# Patient Record
Sex: MALE | Race: ASIAN | NOT HISPANIC OR LATINO
[De-identification: names, ages, dates, MRNs, and addresses within clinical notes are randomized per-mention and may not be internally consistent; named-entity substitution may affect disease eponyms.]

---

## 2018-04-11 ENCOUNTER — APPOINTMENT (OUTPATIENT)
Dept: OTOLARYNGOLOGY | Facility: CLINIC | Age: 43
End: 2018-04-11
Payer: COMMERCIAL

## 2018-04-11 VITALS
BODY MASS INDEX: 26.99 KG/M2 | SYSTOLIC BLOOD PRESSURE: 127 MMHG | WEIGHT: 162 LBS | DIASTOLIC BLOOD PRESSURE: 90 MMHG | HEIGHT: 65 IN | RESPIRATION RATE: 18 BRPM | HEART RATE: 103 BPM

## 2018-04-11 DIAGNOSIS — J34.2 DEVIATED NASAL SEPTUM: ICD-10-CM

## 2018-04-11 DIAGNOSIS — J35.1 HYPERTROPHY OF TONSILS: ICD-10-CM

## 2018-04-11 PROCEDURE — 31575 DIAGNOSTIC LARYNGOSCOPY: CPT

## 2018-04-11 PROCEDURE — 99214 OFFICE O/P EST MOD 30 MIN: CPT | Mod: 25

## 2018-04-12 ENCOUNTER — APPOINTMENT (OUTPATIENT)
Dept: CT IMAGING | Facility: IMAGING CENTER | Age: 43
End: 2018-04-12
Payer: COMMERCIAL

## 2018-04-12 ENCOUNTER — OUTPATIENT (OUTPATIENT)
Dept: OUTPATIENT SERVICES | Facility: HOSPITAL | Age: 43
LOS: 1 days | End: 2018-04-12
Payer: COMMERCIAL

## 2018-04-12 DIAGNOSIS — Z98.89 OTHER SPECIFIED POSTPROCEDURAL STATES: Chronic | ICD-10-CM

## 2018-04-12 DIAGNOSIS — Z00.8 ENCOUNTER FOR OTHER GENERAL EXAMINATION: ICD-10-CM

## 2018-04-12 PROCEDURE — 74177 CT ABD & PELVIS W/CONTRAST: CPT | Mod: 26

## 2018-04-12 PROCEDURE — 74177 CT ABD & PELVIS W/CONTRAST: CPT

## 2018-06-21 ENCOUNTER — APPOINTMENT (OUTPATIENT)
Dept: SLEEP CENTER | Facility: CLINIC | Age: 43
End: 2018-06-21
Payer: COMMERCIAL

## 2018-06-21 ENCOUNTER — OUTPATIENT (OUTPATIENT)
Dept: OUTPATIENT SERVICES | Facility: HOSPITAL | Age: 43
LOS: 1 days | End: 2018-06-21
Payer: COMMERCIAL

## 2018-06-21 DIAGNOSIS — Z98.89 OTHER SPECIFIED POSTPROCEDURAL STATES: Chronic | ICD-10-CM

## 2018-06-21 PROCEDURE — 95810 POLYSOM 6/> YRS 4/> PARAM: CPT | Mod: 26

## 2018-06-21 PROCEDURE — 95810 POLYSOM 6/> YRS 4/> PARAM: CPT

## 2018-06-25 DIAGNOSIS — G47.33 OBSTRUCTIVE SLEEP APNEA (ADULT) (PEDIATRIC): ICD-10-CM

## 2018-07-02 ENCOUNTER — RESULT REVIEW (OUTPATIENT)
Age: 43
End: 2018-07-02

## 2018-07-26 ENCOUNTER — APPOINTMENT (OUTPATIENT)
Dept: PULMONOLOGY | Facility: CLINIC | Age: 43
End: 2018-07-26
Payer: COMMERCIAL

## 2018-07-26 VITALS
HEART RATE: 91 BPM | HEIGHT: 65 IN | TEMPERATURE: 97.4 F | OXYGEN SATURATION: 97 % | SYSTOLIC BLOOD PRESSURE: 90 MMHG | DIASTOLIC BLOOD PRESSURE: 60 MMHG | RESPIRATION RATE: 18 BRPM | BODY MASS INDEX: 43.82 KG/M2 | WEIGHT: 263 LBS

## 2018-07-26 PROCEDURE — 99215 OFFICE O/P EST HI 40 MIN: CPT | Mod: GC

## 2018-07-26 RX ORDER — MOMETASONE 50 UG/1
50 SPRAY, METERED NASAL DAILY
Qty: 1 | Refills: 5 | Status: DISCONTINUED | COMMUNITY
Start: 2018-04-11 | End: 2018-07-26

## 2018-09-18 ENCOUNTER — OUTPATIENT (OUTPATIENT)
Dept: OUTPATIENT SERVICES | Facility: HOSPITAL | Age: 43
LOS: 1 days | End: 2018-09-18
Payer: COMMERCIAL

## 2018-09-18 ENCOUNTER — APPOINTMENT (OUTPATIENT)
Dept: SLEEP CENTER | Facility: CLINIC | Age: 43
End: 2018-09-18
Payer: COMMERCIAL

## 2018-09-18 DIAGNOSIS — Z98.89 OTHER SPECIFIED POSTPROCEDURAL STATES: Chronic | ICD-10-CM

## 2018-09-18 PROCEDURE — 95806 SLEEP STUDY UNATT&RESP EFFT: CPT | Mod: 26

## 2018-09-18 PROCEDURE — 95806 SLEEP STUDY UNATT&RESP EFFT: CPT

## 2018-09-20 ENCOUNTER — RESULT REVIEW (OUTPATIENT)
Age: 43
End: 2018-09-20

## 2018-09-21 DIAGNOSIS — G47.33 OBSTRUCTIVE SLEEP APNEA (ADULT) (PEDIATRIC): ICD-10-CM

## 2018-10-12 ENCOUNTER — OUTPATIENT (OUTPATIENT)
Dept: OUTPATIENT SERVICES | Facility: HOSPITAL | Age: 43
LOS: 1 days | End: 2018-10-12
Payer: COMMERCIAL

## 2018-10-12 ENCOUNTER — APPOINTMENT (OUTPATIENT)
Dept: SLEEP CENTER | Facility: CLINIC | Age: 43
End: 2018-10-12
Payer: COMMERCIAL

## 2018-10-12 DIAGNOSIS — Z98.89 OTHER SPECIFIED POSTPROCEDURAL STATES: Chronic | ICD-10-CM

## 2018-10-12 PROCEDURE — 95807 SLEEP STUDY ATTENDED: CPT | Mod: 26

## 2018-10-12 PROCEDURE — 95807 SLEEP STUDY ATTENDED: CPT

## 2018-10-26 DIAGNOSIS — G47.33 OBSTRUCTIVE SLEEP APNEA (ADULT) (PEDIATRIC): ICD-10-CM

## 2018-11-26 ENCOUNTER — APPOINTMENT (OUTPATIENT)
Dept: PULMONOLOGY | Facility: CLINIC | Age: 43
End: 2018-11-26
Payer: COMMERCIAL

## 2018-11-26 VITALS
HEART RATE: 92 BPM | DIASTOLIC BLOOD PRESSURE: 79 MMHG | TEMPERATURE: 98.2 F | SYSTOLIC BLOOD PRESSURE: 117 MMHG | RESPIRATION RATE: 18 BRPM | OXYGEN SATURATION: 97 % | HEIGHT: 65 IN

## 2018-11-26 PROCEDURE — 99215 OFFICE O/P EST HI 40 MIN: CPT | Mod: GC

## 2018-11-28 ENCOUNTER — APPOINTMENT (OUTPATIENT)
Dept: OTOLARYNGOLOGY | Facility: CLINIC | Age: 43
End: 2018-11-28
Payer: COMMERCIAL

## 2018-11-28 VITALS
HEIGHT: 65 IN | WEIGHT: 160 LBS | HEART RATE: 101 BPM | DIASTOLIC BLOOD PRESSURE: 77 MMHG | SYSTOLIC BLOOD PRESSURE: 114 MMHG | BODY MASS INDEX: 26.66 KG/M2

## 2018-11-28 DIAGNOSIS — R06.83 SNORING: ICD-10-CM

## 2018-11-28 PROCEDURE — 31575 DIAGNOSTIC LARYNGOSCOPY: CPT

## 2018-11-28 PROCEDURE — 99215 OFFICE O/P EST HI 40 MIN: CPT | Mod: 25

## 2018-11-28 PROCEDURE — 92557 COMPREHENSIVE HEARING TEST: CPT

## 2018-11-28 PROCEDURE — 92567 TYMPANOMETRY: CPT

## 2018-11-29 ENCOUNTER — APPOINTMENT (OUTPATIENT)
Dept: OTOLARYNGOLOGY | Facility: CLINIC | Age: 43
End: 2018-11-29

## 2018-12-19 ENCOUNTER — APPOINTMENT (OUTPATIENT)
Dept: OTOLARYNGOLOGY | Facility: CLINIC | Age: 43
End: 2018-12-19
Payer: COMMERCIAL

## 2018-12-19 VITALS
SYSTOLIC BLOOD PRESSURE: 121 MMHG | HEIGHT: 64 IN | DIASTOLIC BLOOD PRESSURE: 74 MMHG | BODY MASS INDEX: 27.31 KG/M2 | HEART RATE: 106 BPM | WEIGHT: 160 LBS

## 2018-12-19 DIAGNOSIS — H91.93 UNSPECIFIED HEARING LOSS, BILATERAL: ICD-10-CM

## 2018-12-19 PROCEDURE — 99214 OFFICE O/P EST MOD 30 MIN: CPT | Mod: 25

## 2018-12-19 PROCEDURE — 92567 TYMPANOMETRY: CPT

## 2018-12-19 PROCEDURE — 92557 COMPREHENSIVE HEARING TEST: CPT

## 2018-12-19 RX ORDER — ACETYLCYSTEINE 600 MG
600 CAPSULE ORAL TWICE DAILY
Qty: 90 | Refills: 0 | Status: DISCONTINUED | COMMUNITY
Start: 2018-11-29 | End: 2018-12-19

## 2018-12-19 RX ORDER — ACETYLCYSTEINE 600 MG
600 CAPSULE ORAL
Qty: 42 | Refills: 2 | Status: DISCONTINUED | COMMUNITY
Start: 2018-11-28 | End: 2018-12-19

## 2018-12-19 RX ORDER — PREDNISONE 20 MG/1
20 TABLET ORAL DAILY
Qty: 30 | Refills: 1 | Status: DISCONTINUED | COMMUNITY
Start: 2018-11-28 | End: 2018-12-19

## 2018-12-20 ENCOUNTER — FORM ENCOUNTER (OUTPATIENT)
Age: 43
End: 2018-12-20

## 2018-12-20 ENCOUNTER — APPOINTMENT (OUTPATIENT)
Dept: PULMONOLOGY | Facility: CLINIC | Age: 43
End: 2018-12-20
Payer: COMMERCIAL

## 2018-12-20 VITALS
HEIGHT: 64 IN | HEART RATE: 100 BPM | OXYGEN SATURATION: 95 % | TEMPERATURE: 97.4 F | DIASTOLIC BLOOD PRESSURE: 60 MMHG | RESPIRATION RATE: 16 BRPM | SYSTOLIC BLOOD PRESSURE: 97 MMHG | BODY MASS INDEX: 27.31 KG/M2 | WEIGHT: 160 LBS

## 2018-12-20 PROCEDURE — 99214 OFFICE O/P EST MOD 30 MIN: CPT | Mod: GC

## 2018-12-21 ENCOUNTER — APPOINTMENT (OUTPATIENT)
Dept: MRI IMAGING | Facility: CLINIC | Age: 43
End: 2018-12-21
Payer: COMMERCIAL

## 2018-12-21 ENCOUNTER — OUTPATIENT (OUTPATIENT)
Dept: OUTPATIENT SERVICES | Facility: HOSPITAL | Age: 43
LOS: 1 days | End: 2018-12-21
Payer: COMMERCIAL

## 2018-12-21 DIAGNOSIS — H91.23 SUDDEN IDIOPATHIC HEARING LOSS, BILATERAL: ICD-10-CM

## 2018-12-21 DIAGNOSIS — H91.93 UNSPECIFIED HEARING LOSS, BILATERAL: ICD-10-CM

## 2018-12-21 DIAGNOSIS — Z98.89 OTHER SPECIFIED POSTPROCEDURAL STATES: Chronic | ICD-10-CM

## 2018-12-21 DIAGNOSIS — Z00.8 ENCOUNTER FOR OTHER GENERAL EXAMINATION: ICD-10-CM

## 2018-12-21 PROCEDURE — 70553 MRI BRAIN STEM W/O & W/DYE: CPT | Mod: 26

## 2018-12-21 PROCEDURE — 70553 MRI BRAIN STEM W/O & W/DYE: CPT

## 2018-12-21 PROCEDURE — A9585: CPT

## 2018-12-24 LAB
ALBUMIN SERPL ELPH-MCNC: 4.8 G/DL
ALP BLD-CCNC: 53 U/L
ALT SERPL-CCNC: 67 U/L
ANION GAP SERPL CALC-SCNC: 12 MMOL/L
AST SERPL-CCNC: 29 U/L
BASOPHILS # BLD AUTO: 0.01 K/UL
BASOPHILS NFR BLD AUTO: 0.1 %
BILIRUB SERPL-MCNC: 0.4 MG/DL
BUN SERPL-MCNC: 12 MG/DL
CALCIUM SERPL-MCNC: 9.8 MG/DL
CHLORIDE SERPL-SCNC: 104 MMOL/L
CO2 SERPL-SCNC: 22 MMOL/L
CREAT SERPL-MCNC: 0.91 MG/DL
EOSINOPHIL # BLD AUTO: 0 K/UL
EOSINOPHIL NFR BLD AUTO: 0 %
ERYTHROCYTE [SEDIMENTATION RATE] IN BLOOD BY WESTERGREN METHOD: 2 MM/HR
GLUCOSE SERPL-MCNC: 134 MG/DL
HCT VFR BLD CALC: 44.6 %
HGB BLD-MCNC: 15.5 G/DL
IMM GRANULOCYTES NFR BLD AUTO: 0 %
LYMPHOCYTES # BLD AUTO: 0.77 K/UL
LYMPHOCYTES NFR BLD AUTO: 11.5 %
MAN DIFF?: NORMAL
MCHC RBC-ENTMCNC: 32.4 PG
MCHC RBC-ENTMCNC: 34.8 GM/DL
MCV RBC AUTO: 93.1 FL
MONOCYTES # BLD AUTO: 0.01 K/UL
MONOCYTES NFR BLD AUTO: 0.1 %
NEUTROPHILS # BLD AUTO: 5.91 K/UL
NEUTROPHILS NFR BLD AUTO: 88.3 %
PLATELET # BLD AUTO: 273 K/UL
POTASSIUM SERPL-SCNC: 4.5 MMOL/L
PROT SERPL-MCNC: 7.3 G/DL
RBC # BLD: 4.79 M/UL
RBC # FLD: 12.8 %
SODIUM SERPL-SCNC: 138 MMOL/L
T PALLIDUM AB SER QL IA: NEGATIVE
T4 SERPL-MCNC: 5.9 UG/DL
TRIGL SERPL-MCNC: 123 MG/DL
TSH SERPL-ACNC: 0.23 UIU/ML
WBC # FLD AUTO: 6.7 K/UL

## 2018-12-26 ENCOUNTER — APPOINTMENT (OUTPATIENT)
Dept: OTOLARYNGOLOGY | Facility: CLINIC | Age: 43
End: 2018-12-26

## 2018-12-27 LAB — ANA SER IF-ACNC: NEGATIVE

## 2019-01-04 ENCOUNTER — APPOINTMENT (OUTPATIENT)
Dept: OTOLARYNGOLOGY | Facility: CLINIC | Age: 44
End: 2019-01-04
Payer: COMMERCIAL

## 2019-01-04 VITALS
BODY MASS INDEX: 26.33 KG/M2 | SYSTOLIC BLOOD PRESSURE: 114 MMHG | WEIGHT: 158 LBS | HEART RATE: 115 BPM | DIASTOLIC BLOOD PRESSURE: 76 MMHG | HEIGHT: 65 IN

## 2019-01-04 DIAGNOSIS — H90.12 CONDUCTIVE HEARING LOSS, UNILATERAL, LEFT EAR, WITH UNRESTRICTED HEARING ON THE CONTRALATERAL SIDE: ICD-10-CM

## 2019-01-04 PROCEDURE — 31231 NASAL ENDOSCOPY DX: CPT

## 2019-01-04 PROCEDURE — 92567 TYMPANOMETRY: CPT

## 2019-01-04 PROCEDURE — 92557 COMPREHENSIVE HEARING TEST: CPT

## 2019-01-04 PROCEDURE — 69433 CREATE EARDRUM OPENING: CPT | Mod: LT

## 2019-01-04 PROCEDURE — 99214 OFFICE O/P EST MOD 30 MIN: CPT | Mod: 25

## 2019-01-04 NOTE — PROCEDURE
[FreeTextEntry6] : Afrin and lidocaine were topically sprayed. Flexible scope #2 was used. Right nasal passage with normal boggy edematous inferior turbinate and normal middle and superior turbinates. Nasal passage patent with clear middle meatus and sphenoethmoid recess. Left nasal passage with normal boggy edematous inferior turbinate and normal middle and superior turbinates. Nasal passage was patent with clear middle meatus and sphenoethmoid recess. No mucopurulence or polyps appreciated. Nasopharynx with mild adenoid tissue with bilateral ET orifices effaced with lymphoid tissue. [Same] : same as the Pre Op Dx. [] : M & T [FreeTextEntry4] : topical tetracaine [FreeTextEntry5] : salazar tube placed, suctioned out clear milena effusion

## 2019-01-04 NOTE — HISTORY OF PRESENT ILLNESS
[de-identified] : 44yo male with hx UPPP/sept by  for SERENE who was seen for sudden hearing loss in November by Dr. Godinez. He placed him on high dose steroid taper which he completed but he did not have significant improvement and was a bit worse. He was given another course but tapered it early as he found no improvement. He was also told to start NAC which he has been taking daily. He had MRI which was negative for retrocochlear lesion however he did have bilateral mastoid effusions. He is using flonase nearly daily. He has been using zyrtec and sudafed for the past four or five days and he feels it is helping a little bit.

## 2019-01-04 NOTE — ASSESSMENT
[FreeTextEntry1] : left serous otitis media with mixed HL left:\par - tympanostomy tube placed today\par \par b/l sudden SNHL:\par - failed two courses of steroids \par - repeat audio next visit\par \par nasal congestion/ETD:\par - increase flonase to BID\par - cont zyrtec and sudafed\par - may have him start irrigations with budesonide next visit if persistent inflammation \par

## 2019-01-04 NOTE — PHYSICAL EXAM
[de-identified] : right mucoid stranding and left with complete milena effusion [de-identified] : edematous [de-identified] : clear mucoid [Normal] : mucosa is normal [Midline] : trachea located in midline position

## 2019-01-07 PROBLEM — H90.12 CONDUCTIVE HEARING LOSS IN LEFT EAR: Status: ACTIVE | Noted: 2019-01-07

## 2019-01-09 ENCOUNTER — APPOINTMENT (OUTPATIENT)
Dept: OTOLARYNGOLOGY | Facility: CLINIC | Age: 44
End: 2019-01-09

## 2019-02-15 ENCOUNTER — APPOINTMENT (OUTPATIENT)
Dept: OTOLARYNGOLOGY | Facility: CLINIC | Age: 44
End: 2019-02-15
Payer: COMMERCIAL

## 2019-02-15 VITALS
BODY MASS INDEX: 26.33 KG/M2 | HEIGHT: 65 IN | HEART RATE: 97 BPM | WEIGHT: 158 LBS | SYSTOLIC BLOOD PRESSURE: 114 MMHG | DIASTOLIC BLOOD PRESSURE: 74 MMHG

## 2019-02-15 DIAGNOSIS — H91.23 SUDDEN IDIOPATHIC HEARING LOSS, BILATERAL: ICD-10-CM

## 2019-02-15 DIAGNOSIS — H90.8 MIXED CONDUCTIVE AND SENSORINEURAL HEARING LOSS, UNSPECIFIED: ICD-10-CM

## 2019-02-15 DIAGNOSIS — J35.2 HYPERTROPHY OF ADENOIDS: ICD-10-CM

## 2019-02-15 DIAGNOSIS — H65.92 UNSPECIFIED NONSUPPURATIVE OTITIS MEDIA, LEFT EAR: ICD-10-CM

## 2019-02-15 DIAGNOSIS — R09.81 NASAL CONGESTION: ICD-10-CM

## 2019-02-15 PROCEDURE — 92557 COMPREHENSIVE HEARING TEST: CPT

## 2019-02-15 PROCEDURE — 99214 OFFICE O/P EST MOD 30 MIN: CPT | Mod: 25

## 2019-02-15 PROCEDURE — 31231 NASAL ENDOSCOPY DX: CPT

## 2019-02-15 PROCEDURE — 92567 TYMPANOMETRY: CPT

## 2019-02-15 NOTE — ASSESSMENT
[FreeTextEntry1] : left serous otitis media with mixed HL left:\par - CHL resolved with tube placememt\par \par new right sided effusion:\par - has URI so may be due to that\par - advised to start neilmed rinse daily and add budesonide to irrigation\par - flonase 2 sprays twice a day\par \par b/l sudden SNHL:\par - seems stable on audio\par \par nasal congestion/ETD/adenoid hypertrophy:\par - increase flonase to BID\par - cont zyrtec and sudafed\par - start irrigations with budesonide next visit for persistent inflammation \par \par f/u 4-6 weeks

## 2019-02-15 NOTE — HISTORY OF PRESENT ILLNESS
[de-identified] : 42yo male with hx UPPP/septo by  for SERENE who was seen for sudden hearing loss in November by Dr. Godinez. He placed him on high dose steroid taper which he completed but he did not have significant improvement and was a bit worse. He was given another course but tapered it early as he found no improvement. He was also told to start NAC which he has been taking daily. He had MRI which was negative for retrocochlear lesion however he did have bilateral mastoid effusions. He is using flonase nearly daily. He has been using zyrtec and sudafed for the past four or five days and he feels it is helping a little bit. [FreeTextEntry1] : At last visit noted to have Lt. Serous OM with Mixed HL - Had Tube placed on left 1/4/19.   Also B/L SSNHL - Failed two courses of steroids.\par Now feels hearing is the left ear is a bit improved, however still not fully returned.  No pain, no d/c, No Vertigo. \par Continues to have Nasal congestion.  Continues to Use Flonase, advised at last visit to increase to BID, however continues to use QD.  Also using Zyrtec but not Sudafed.

## 2019-02-15 NOTE — PROCEDURE
[Same] : same as the Pre Op Dx. [] : M & T [FreeTextEntry6] : Afrin and lidocaine were topically sprayed. Flexible scope #2 was used. Right nasal passage with boggy edematous inferior turbinate and normal middle and superior turbinates. Nasal passage patent with clear middle meatus and sphenoethmoid recess. Left nasal passage with  boggy edematous inferior turbinate and normal middle and superior turbinates. Nasal passage was patent with clear middle meatus and sphenoethmoid recess. No mucopurulence or polyps appreciated. Nasopharynx with mild adenoid tissue with bilateral ET orifices effaced with lymphoid tissue. [FreeTextEntry4] : topical tetracaine [FreeTextEntry5] : salazar tube placed, suctioned out clear milena effusion

## 2019-02-15 NOTE — PHYSICAL EXAM
[Normal] : mucosa is normal [Midline] : trachea located in midline position [de-identified] : Right now with complete effusion.  Left with Tube in place and open.  [de-identified] : edematous [de-identified] : clear mucoid

## 2019-04-05 ENCOUNTER — APPOINTMENT (OUTPATIENT)
Dept: OTOLARYNGOLOGY | Facility: CLINIC | Age: 44
End: 2019-04-05

## 2020-08-22 ENCOUNTER — FORM ENCOUNTER (OUTPATIENT)
Age: 45
End: 2020-08-22

## 2020-09-07 ENCOUNTER — FORM ENCOUNTER (OUTPATIENT)
Age: 45
End: 2020-09-07

## 2020-09-23 ENCOUNTER — APPOINTMENT (OUTPATIENT)
Dept: PULMONOLOGY | Facility: CLINIC | Age: 45
End: 2020-09-23
Payer: COMMERCIAL

## 2020-09-23 VITALS
BODY MASS INDEX: 24.19 KG/M2 | HEART RATE: 101 BPM | WEIGHT: 145.38 LBS | SYSTOLIC BLOOD PRESSURE: 131 MMHG | OXYGEN SATURATION: 98 % | TEMPERATURE: 97.6 F | RESPIRATION RATE: 15 BRPM | DIASTOLIC BLOOD PRESSURE: 93 MMHG

## 2020-09-23 PROCEDURE — 99214 OFFICE O/P EST MOD 30 MIN: CPT | Mod: GC

## 2020-09-23 NOTE — PHYSICAL EXAM
[General Appearance - Well Developed] : well developed [General Appearance - Well Nourished] : well nourished [Low Lying Soft Palate] : low lying soft palate [IV] : IV [Neck Appearance] : the appearance of the neck was normal [Heart Rate And Rhythm] : heart rate was normal and rhythm regular [Heart Sounds] : normal S1 and S2 [] : no respiratory distress [Auscultation Breath Sounds / Voice Sounds] : lungs were clear to auscultation bilaterally [Bowel Sounds] : normal bowel sounds [Abdomen Soft] : soft [Abdomen Tenderness] : non-tender [Nail Clubbing] : no clubbing of the fingernails [Cyanosis, Localized] : no localized cyanosis [Skin Color & Pigmentation] : normal skin color and pigmentation [Skin Turgor] : normal skin turgor [Motor Exam] : the motor exam was normal [No Focal Deficits] : no focal deficits [Oriented To Time, Place, And Person] : oriented to person, place, and time [Mood] : the mood was normal

## 2020-09-24 NOTE — HISTORY OF PRESENT ILLNESS
[Snoring] : snoring [Witnessed Apneas] : witnessed sleep apnea [Frequent Nocturnal Awakening] : frequent nocturnal awakening [Daytime Somnolence] : daytime somnolence [Unintentional Sleep While Inactive] : unintentional sleep while inactive [Awakes Unrefreshed] : awakening unrefreshed [DMS] : DMS [Sleep Paralysis] : sleep paralysis [Obstructive Sleep Apnea] : obstructive sleep apnea [FreeTextEntry1] : 45M hx severe SERENE s/p UPPP, who comes for follow up visit. Last seen on 12/20/2018. At that time he was using APAP with FFM but continued to have incomplete resolution of sleep disordered breathing. Large mask leak was thought to be the cause. He was ordered for mask fitting a Sleep center with plans for nasal mask w/chin strap. However pt was lost to follow up. \par \par Today pt states that he is using his CPAP every day, but feels that it is not working any more. At times, does not have enough pressure and wife has noticed apneic episodes. At times, the humidifier does not always empty by the morning or empties too quickly. Last set of supplies was delivered to him 6-9 months ago. Wears FFM, cannot tolerate nasal because of choking/gagging. He is also requesting using a constant pressure (currently on APAP). \par \par Also has sleep paralysis, where he cannot move his hands or legs and experiences numbness on the top of his foot. Happens every night. No parasomnias or vivid dreams. \par \par Sleep schedule: 11:30PM to bed, sleep latency <10 min, 3-4 nighttime awakenings- has difficult time falling asleep when he wakes up, wakes up at 6:30-7AM, nonrestorative; on average sleeping 7 hours; no naps during the day. Prev had drowsy driving. no AM headaches\par \par PAP compliance: 100% days over 4 hours, uses 7 hr 27 min on days used; Therapy AHI 10.6; 90th percentile 12.4 cmH2O. \par PAP Acclimatization 10/2018: Dreamwear FFM, medium (158 lb) \par HST 9/2018: KARTIK 72.5, T90 57.8% \par PSG 6/2018: AHI 92.9, T90 61.3% \par CPAP titration 5/2016: optimal CPAP 10 cmH2O [Unintentional Sleep while Active] : no unintentional sleep while active [Awakes with Headache] : no headache upon awakening [Awakening With Dry Mouth] : no dry mouth upon awakening [Recent  Weight Gain] : no recent weight gain [DIS] : no DIS [Unusual Sleep Behavior] : no unusual sleep behavior [Hypersomnolence] : no hypersomnolence [Cataplexy] : no cataplexy [Hypnagogic Hallucinations] : no hallucinations when falling asleep [Hypnopompic Hallucinations] : no hallucinations when awakening [Lower Extremity Discomfort] : no lower extremity discomfort in evening or at bedtime

## 2020-09-24 NOTE — REVIEW OF SYSTEMS
[EDS: ESS=____] : daytime somnolence: ESS=[unfilled] [Snoring] : snoring [Witnessed Apneas] : witnessed apnea [Difficulty Maintaining Sleep] : difficulty maintaining sleep [Sleep Paralysis] : sleep paralysis [Negative] : Psychiatric [Fatigue] : no fatigue [Nasal Congestion] : no nasal congestion [Shortness Of Breath] : no shortness of breath [Difficulty Initiating Sleep] : no difficulty falling asleep [Lower Extremity Discomfort] : no lower extremity discomfort [Irresistible urge to move legs] : no irresistible urge to move legs because of lower extremity discomfort [Late day/ Evening symptoms] : no late day/evening symptoms [Sleep Disturbances due to LE symptoms] : ~T no sleep disturbances due to lower extremity symptoms [Unusual Sleep Behavior] : no unusual sleep behavior [Hypersomnolence] : not sleeping much more than usual [Cataplexy] :  no cataplexy

## 2020-09-24 NOTE — ASSESSMENT
[FreeTextEntry1] : 45M hx severe SERENE s/p UPPP, who comes for follow up visit. Last seen on 12/20/2018. At that time he was using APAP with FFM. He is currently has good compliance with his PAP therapy with therapy AHI 10.1. His wife has noticed more witnessed apneic episodes. Will change from APAP to CPAP of 12 cmH2O as he reports that with APAP he knows periods of insufficient airflow and the download of waveforms and therapy data suggest recurrence of disordered breathing events when the APAP pressure is reduced.  Therefore he may do better with the 90th percentile pressure of CPAP of 12 cm of water pressure.  His machine will be reset to CPAP of 12 and we will then assess his response both clinically and with the downloaded therapy data from his device.   reassess response. \par - supplies refilled for patient\par - f/u 1 month

## 2020-09-30 ENCOUNTER — APPOINTMENT (OUTPATIENT)
Dept: OTOLARYNGOLOGY | Facility: CLINIC | Age: 45
End: 2020-09-30
Payer: COMMERCIAL

## 2020-09-30 VITALS
BODY MASS INDEX: 24.16 KG/M2 | TEMPERATURE: 97.2 F | DIASTOLIC BLOOD PRESSURE: 71 MMHG | HEART RATE: 102 BPM | SYSTOLIC BLOOD PRESSURE: 110 MMHG | HEIGHT: 65 IN | WEIGHT: 145 LBS

## 2020-09-30 PROCEDURE — 99214 OFFICE O/P EST MOD 30 MIN: CPT

## 2020-10-02 NOTE — HISTORY OF PRESENT ILLNESS
[de-identified] : 42yo male with hx UPPP/septo by  for SERENE who was seen for sudden hearing loss in November 2018 by Dr. Godinez. He placed him on high dose steroid taper which he completed but he did not have significant improvement and was a bit worse. He was given another course but tapered it early as he found no improvement. He was also told to start NAC which he has been taking daily. He had MRI which was negative for retrocochlear lesion however he did have bilateral mastoid effusions.  [FreeTextEntry1] : At last visit noted to have Lt. Serous OM with Mixed HL - Had Tube placed on left 1/4/19.   Also B/L SSNHL - Failed two courses of steroids.\par Now feels hearing is the left ear is a bit improved, however still not fully returned.  No pain, no d/c, No Vertigo. \par Continues to have Nasal congestion.  Continues to Use Flonase, advised at last visit to increase to BID, however continues to use QD.  Also using Zyrtec but not Sudafed.

## 2020-10-02 NOTE — PHYSICAL EXAM
[Normal] : mucosa is normal [Midline] : trachea located in midline position [Removed] : palatine tonsils previously removed [de-identified] : Right now with complete effusion.  Left with Tube in place and open.  [de-identified] : edematous [de-identified] : clear mucoid

## 2020-10-02 NOTE — ASSESSMENT
[FreeTextEntry1] : left otorrhea with tube in place:\par - ciprodex instilled now\par \par b/l SNHL:\par - repeat audio next visit\par \par nasal congestion/ETD/adenoid inflammation:\par - fluticasone/astelin BID\par \par f/u 2 weeks

## 2020-10-19 ENCOUNTER — APPOINTMENT (OUTPATIENT)
Dept: OTOLARYNGOLOGY | Facility: CLINIC | Age: 45
End: 2020-10-19
Payer: COMMERCIAL

## 2020-10-19 VITALS
BODY MASS INDEX: 25.27 KG/M2 | DIASTOLIC BLOOD PRESSURE: 70 MMHG | SYSTOLIC BLOOD PRESSURE: 122 MMHG | HEART RATE: 97 BPM | HEIGHT: 64 IN | TEMPERATURE: 97.6 F | WEIGHT: 148 LBS

## 2020-10-19 DIAGNOSIS — H92.02 OTALGIA, LEFT EAR: ICD-10-CM

## 2020-10-19 DIAGNOSIS — H69.82 OTHER SPECIFIED DISORDERS OF EUSTACHIAN TUBE, LEFT EAR: ICD-10-CM

## 2020-10-19 PROCEDURE — 99213 OFFICE O/P EST LOW 20 MIN: CPT

## 2020-10-19 PROCEDURE — 99072 ADDL SUPL MATRL&STAF TM PHE: CPT

## 2020-10-19 RX ORDER — ACETYLCYSTEINE 600 MG
600 CAPSULE ORAL
Qty: 42 | Refills: 2 | Status: DISCONTINUED | COMMUNITY
Start: 2018-12-19 | End: 2020-10-19

## 2020-10-19 RX ORDER — BUDESONIDE 0.5 MG/2ML
0.5 INHALANT ORAL
Qty: 1 | Refills: 5 | Status: DISCONTINUED | COMMUNITY
Start: 2019-02-15 | End: 2020-10-19

## 2020-10-19 NOTE — ASSESSMENT
[FreeTextEntry1] : Lt. Otalgia\par - Tube in place with small granulation tissue. \par - Ciprodex gtts. x 10 days\par - F/U 2 weeks. \par \par B/l SNHL\par - Repeat Audio at next visit. \par \par ETD / Congestion\par - Continue Flonase and Azelastine.

## 2020-10-19 NOTE — PHYSICAL EXAM
[Normal] : external ears are normal bilaterally [de-identified] : Right tube in place with small granulation tissue at inferior portion of tube.

## 2020-10-19 NOTE — HISTORY OF PRESENT ILLNESS
[de-identified] : 44 y/o M with Hx of SSNHL November 2018, treated by Dr. Godinez with High dose steroids without improvement.  MRI showed b/l mastoid effusions.  \par Then at F/U had Lt. Serous OM and Tube was placed in left on 1/4/19.   \par Using Flonase and Zyrtec - notes nasal congestion is improved\par At last visit on 9/30/20 noted to hve Lt. otorrhea with tube in place.  Treated with Ciprodex.   He notes left ear d/c improved.  Now yesterday started having left ear pain that radiates to his face.  No d/c.  Hearing is better then last visit.

## 2020-10-21 ENCOUNTER — APPOINTMENT (OUTPATIENT)
Dept: OTOLARYNGOLOGY | Facility: CLINIC | Age: 45
End: 2020-10-21

## 2021-06-02 ENCOUNTER — APPOINTMENT (OUTPATIENT)
Dept: HEPATOLOGY | Facility: CLINIC | Age: 46
End: 2021-06-02
Payer: COMMERCIAL

## 2021-06-02 VITALS
DIASTOLIC BLOOD PRESSURE: 65 MMHG | WEIGHT: 154 LBS | TEMPERATURE: 97.9 F | HEART RATE: 111 BPM | BODY MASS INDEX: 26.29 KG/M2 | SYSTOLIC BLOOD PRESSURE: 134 MMHG | RESPIRATION RATE: 15 BRPM | HEIGHT: 64 IN

## 2021-06-02 PROCEDURE — 99072 ADDL SUPL MATRL&STAF TM PHE: CPT

## 2021-06-02 PROCEDURE — 91200 LIVER ELASTOGRAPHY: CPT

## 2021-06-02 PROCEDURE — 99204 OFFICE O/P NEW MOD 45 MIN: CPT

## 2021-06-02 RX ORDER — PREDNISONE 20 MG/1
20 TABLET ORAL DAILY
Qty: 60 | Refills: 1 | Status: COMPLETED | COMMUNITY
Start: 2018-12-19 | End: 2021-06-02

## 2021-06-02 RX ORDER — FLUTICASONE PROPIONATE 50 UG/1
50 SPRAY, METERED NASAL
Qty: 3 | Refills: 3 | Status: COMPLETED | COMMUNITY
Start: 2020-10-01 | End: 2021-06-02

## 2021-06-02 RX ORDER — CIPROFLOXACIN AND DEXAMETHASONE 3; 1 MG/ML; MG/ML
0.3-0.1 SUSPENSION/ DROPS AURICULAR (OTIC) TWICE DAILY
Qty: 1 | Refills: 0 | Status: COMPLETED | COMMUNITY
Start: 2020-09-30 | End: 2021-06-02

## 2021-06-02 RX ORDER — CIPROFLOXACIN AND DEXAMETHASONE 3; 1 MG/ML; MG/ML
0.3-0.1 SUSPENSION/ DROPS AURICULAR (OTIC)
Qty: 1 | Refills: 0 | Status: COMPLETED | COMMUNITY
Start: 2020-10-19 | End: 2021-06-02

## 2021-06-02 RX ORDER — AZELASTINE HYDROCHLORIDE AND FLUTICASONE PROPIONATE 137; 50 UG/1; UG/1
137-50 SPRAY, METERED NASAL
Qty: 1 | Refills: 5 | Status: COMPLETED | COMMUNITY
Start: 2020-09-30 | End: 2021-06-02

## 2021-06-02 RX ORDER — FLUTICASONE PROPIONATE 50 MCG
SPRAY, SUSPENSION NASAL
Refills: 0 | Status: COMPLETED | COMMUNITY
End: 2021-06-02

## 2021-06-02 RX ORDER — AZELASTINE HYDROCHLORIDE 137 UG/1
0.1 SPRAY, METERED NASAL TWICE DAILY
Qty: 3 | Refills: 3 | Status: COMPLETED | COMMUNITY
Start: 2020-10-01 | End: 2021-06-02

## 2021-06-02 NOTE — HISTORY OF PRESENT ILLNESS
[de-identified] : Mr. Muse is a 45 yo M with overweight BMI, dyslipidemia (currently off medication), history of pre-diabetes (with HbA1c 5.8% in 2016, improved to 5.6% on last labs on 5/7/21), SERENE (on CPAP), and alcohol use disorder, who is being seen for evaluation of abnormal liver enzymes on his recent labs. He is accompanied by his wife, Rhett Muse, who is a nurse practitioner at the [facility].\par \par He has had hepatic steatosis noted on prior abdominal sonogram in 2016 and contrast-enhanced CT in 2018, as well as a history of mild liver enzyme elevations dating back several years. His wife reports that he had a rise in his liver enzymes in 2016 after being treated with atorvastatin, leading to discontinuation of the medication as well as to the liver imaging as part of his work-up. She became concerned after his most recent labs (from 5/7/21) showed an interval increase in his AST and ALT to 86 and 178 U/L, respectively. Previously, he had mainly had elevations in ALT only and typically <2x ULN.\par \par He reports daily, heavy alcohol consumption for >10 years. He mainly drinks whiskey, which he buys in 1.75L sized bottles. He used to drink a bottle over 9-10 days, but his drinking increased in the past year since the COVID-19 pandemic and he now finishes each bottle over 5 days. He has not had any alcohol-free days in the past year, and says that his longest period of sobriety in recent years prior to that was about 4 days before he resumed drinking. He typically starts drinking at 10-11am after getting to work and drinks throughout the day and until he goes to bed. He feels guilty about his drinking. He has never tried to attend AA or any rehabilitation program and has never tried MAT. He denies any history of DWIs or other legal problems related to his alcohol consumption. No prior alcohol-related ER visits or hospitalizations.\par \par He says he "gets tired fast" and has exertional dyspnea. He often has vomiting in the morning when he first wakes up and that he attributes to use of his CPAP machine. He does not have vomiting later in the day. He has lost 10 lbs in the past year. He typically only eats 1 meal per day in the evening, as he no longer eats breakfast and does not usually eat while at work. Apart from alcohol, he likes to drink water sweetened with lemonade flavoring.\par \par He denies any history of scleral icterus, overt GI bleeding, abdominal distension, lower extremity swelling, or confusion. He weighed 180 lbs at his heaviest in 2017, then intentionally lost a little weight.\par \par He says he is very worried about his overall health, stating that he often worries that he will not live long enough to see his children grown and . He admits to having some depression in the past year but says it has been getting better recently now that COVID-19 restrictions are easing.\par \par He underwent colonoscopy in 2006 due to his family history of colon cancer. He was asympomatic at the time. Per his wife, it was normal with no polyps or masses seen. No prior EGD.\par \par He denies any current prescribed or OTC medications, including no herbal/dietary supplements.\par \par He has no known family history of alcohol or substance use disorders or liver diseases. He has a strong family history of colon cancer affecting multiple maternal relatives across >1 generation, including some of his mother's siblings as well as his cousins and cousin's children. He thinks the youngest family member to get diagnosed with colon cancer was his cousin's son, who was diagnosed at age 26 or 27. His mother's siblings also had pancreatic and brain tumors, and several maternal relatives have diabetes. Both of his parents are living. He has 1 brother and 1 sister, both healthy to his knowledge. His children are healthy.\par \par He is  and lives with his wife and their two children (ages 11 and 12 years). They have been  since 2006. He smokes 7-8 cigarettes/day and is not yet ready to quit. He denies any history of recreational drug use. 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 Normal spleen. No ascites.\par \par CT abdomen/pelvis with contrast (4/12/18): Mild hepatic steatosis. No focal hepatic lesions. Otherwise unremarkable.

## 2021-06-02 NOTE — REVIEW OF SYSTEMS
[Feeling Poorly] : feeling poorly [Feeling Tired] : feeling tired [Recent Weight Loss (___ Lbs)] : recent [unfilled] ~Ulb weight loss [SOB on Exertion] : shortness of breath during exertion [As Noted in HPI] : as noted in HPI [Sleep Disturbances] : sleep disturbances [Depression] : depression [Feelings Of Weakness] : feelings of weakness [Negative] : Heme/Lymph [Suicidal] : not suicidal [Anxiety] : no anxiety [FreeTextEntry9] : chronic intermittent left lower back pain

## 2021-06-02 NOTE — ASSESSMENT
[FreeTextEntry1] : 47 yo M with overweight BMI, dyslipidemia (currently off medication), history of pre-diabetes (with HbA1c 5.8% in 2016, improved to 5.6% on last labs on 5/7/21), SERENE (on CPAP), and moderate alcohol use disorder (AUD), with elevated liver enzymes and imaging evidence of hepatic steatosis consistent with alcohol-associated liver disease. He may also have some component of nonalcoholic (metabolic) fatty liver disease given risk factors.\par \par # Elevated liver enzymes due to alcohol-associated and metabolic fatty liver:\par - Abdominal sonogram ordered to rule out cirrhosis or hepatocellular carcinoma (HCC).\par - We discussed the diagnosis of alcohol-associated and metabolic fatty liver disease length today. I reviewed the natural history, evaluation and staging of the disease including his FibroScan results (with fibrosis possibly overestimated in setting of active alcohol consumption), and prognosis, including possible risks of development of alcoholic hepatitis, cirrhosis, and hepatocellular carcinoma (HCC) as well as increased risks of diabetes mellitus, chronic kidney disease, and cardiovascular disease.\par - We discussed that fatty liver is potentially reversible, and that alcohol abstinence or at least moderation will be the most important modifiable risk factor for improving his liver health. We also discussed that alcohol moderation or abstinence can also improve his overall health, as I am concerned that he has not been getting good nutrition recently given that much of his daily caloric intake is in the form of alcohol. Management of AUD as below.\par - In addition, we discussed adherence to a Mediterranean style diet with increased consumption of vegetables and lean proteins, avoidance of red meat and high fructose corn syrup, and avoidance of calorie-containing beverages. I recommended moderate intensity exercise for a minimum of 20-30 minutes at least 3 times per week.\par \par # Moderate AUD:\par - We discussed the potential risks of continued heavy alcohol consumption (as above) as well as potential health benefits of alcohol abstinence or at least moderation. We discussed that moderate alcohol consumption for men consists of <=2 standard drinks/day and <=14 standard drinks/week. Currently, he is drinking ~8 standard drinks/day.\par - We discussed that a combination of MAT and behavioral therapy / alcohol rehabilitation can be most effective, and he was open to hearing about outpatient/virtual alcohol RPPs from the [facility] service, with referral placed today. We will also start a trial of naltrexone 50 mg po daily for MAT, and he was advised to call me if experiencing any adverse effects so that we could consider alternatives.\par - If he tolerates oral naltrexone well, then he may eventually be a good candidate for Vivitrol.\par \par # Nausea/vomiting: May be alcohol-related, but EGD ordered today to rule out acid-related or structural problem.\par \par # Family history of colon cancer: No personal history of colon polyps on initial colonoscopy in 2006 but now overdue for surveillance. Repeat colonoscopy ordered today.\par \par # Health maintenance:\par - Will check HAV and HBV serologies and offer vaccination if non-immune.\par \par Next follow-up: 1 month

## 2021-06-03 LAB
AFP-TM SERPL-MCNC: 2.4 NG/ML
ALBUMIN SERPL ELPH-MCNC: 5.1 G/DL
ALP BLD-CCNC: 83 U/L
ALT SERPL-CCNC: 244 U/L
ANION GAP SERPL CALC-SCNC: 11 MMOL/L
AST SERPL-CCNC: 160 U/L
BASOPHILS # BLD AUTO: 0.04 K/UL
BASOPHILS NFR BLD AUTO: 0.9 %
BILIRUB SERPL-MCNC: 0.8 MG/DL
BUN SERPL-MCNC: 13 MG/DL
CALCIUM SERPL-MCNC: 10.2 MG/DL
CHLORIDE SERPL-SCNC: 104 MMOL/L
CO2 SERPL-SCNC: 26 MMOL/L
CREAT SERPL-MCNC: 0.9 MG/DL
EOSINOPHIL # BLD AUTO: 0.29 K/UL
EOSINOPHIL NFR BLD AUTO: 6.6 %
FERRITIN SERPL-MCNC: 645 NG/ML
GGT SERPL-CCNC: 329 U/L
GLUCOSE SERPL-MCNC: 105 MG/DL
HBV CORE IGG+IGM SER QL: NONREACTIVE
HBV SURFACE AB SERPL IA-ACNC: <3 MIU/ML
HBV SURFACE AG SER QL: NONREACTIVE
HCT VFR BLD CALC: 48 %
HCV AB SER QL: NONREACTIVE
HCV S/CO RATIO: 0.28 S/CO
HEPATITIS A IGG ANTIBODY: NONREACTIVE
HGB BLD-MCNC: 16.3 G/DL
IMM GRANULOCYTES NFR BLD AUTO: 0.2 %
INR PPP: 0.97 RATIO
LYMPHOCYTES # BLD AUTO: 1 K/UL
LYMPHOCYTES NFR BLD AUTO: 22.8 %
MAGNESIUM SERPL-MCNC: 2.1 MG/DL
MAN DIFF?: NORMAL
MCHC RBC-ENTMCNC: 33.9 PG
MCHC RBC-ENTMCNC: 34 GM/DL
MCV RBC AUTO: 99.8 FL
MONOCYTES # BLD AUTO: 0.34 K/UL
MONOCYTES NFR BLD AUTO: 7.7 %
NEUTROPHILS # BLD AUTO: 2.71 K/UL
NEUTROPHILS NFR BLD AUTO: 61.8 %
PHOSPHATE SERPL-MCNC: 3.5 MG/DL
PLATELET # BLD AUTO: 232 K/UL
POTASSIUM SERPL-SCNC: 5.5 MMOL/L
PROT SERPL-MCNC: 7.5 G/DL
PT BLD: 11.6 SEC
RBC # BLD: 4.81 M/UL
RBC # FLD: 12.7 %
SODIUM SERPL-SCNC: 141 MMOL/L
WBC # FLD AUTO: 4.39 K/UL

## 2021-06-04 LAB — ZINC SERPL-MCNC: 85 UG/DL

## 2021-06-07 LAB — PHOSPHATIDYETHANOL (PETH), WHOLE BLOOD: 812 NG/ML

## 2021-06-08 LAB — VIT B1 SERPL-MCNC: 106.3 NMOL/L

## 2021-06-11 ENCOUNTER — NON-APPOINTMENT (OUTPATIENT)
Age: 46
End: 2021-06-11

## 2021-06-14 ENCOUNTER — NON-APPOINTMENT (OUTPATIENT)
Age: 46
End: 2021-06-14

## 2021-06-16 ENCOUNTER — NON-APPOINTMENT (OUTPATIENT)
Age: 46
End: 2021-06-16

## 2021-06-18 ENCOUNTER — NON-APPOINTMENT (OUTPATIENT)
Age: 46
End: 2021-06-18

## 2021-06-18 LAB
ALBUMIN SERPL ELPH-MCNC: 5.3 G/DL
ALP BLD-CCNC: 84 U/L
ALT SERPL-CCNC: 346 U/L
AMYLASE/CREAT SERPL: 107 U/L
ANION GAP SERPL CALC-SCNC: 17 MMOL/L
AST SERPL-CCNC: 232 U/L
BILIRUB SERPL-MCNC: 0.8 MG/DL
BUN SERPL-MCNC: 8 MG/DL
CALCIUM SERPL-MCNC: 10.5 MG/DL
CHLORIDE SERPL-SCNC: 99 MMOL/L
CO2 SERPL-SCNC: 25 MMOL/L
CREAT SERPL-MCNC: 0.86 MG/DL
GGT SERPL-CCNC: 332 U/L
GLUCOSE SERPL-MCNC: 81 MG/DL
LPL SERPL-CCNC: 61 U/L
POTASSIUM SERPL-SCNC: 4.5 MMOL/L
PROT SERPL-MCNC: 8.1 G/DL
SODIUM SERPL-SCNC: 141 MMOL/L

## 2021-06-24 ENCOUNTER — INPATIENT (INPATIENT)
Facility: HOSPITAL | Age: 46
LOS: 2 days | Discharge: HOME | DRG: 897 | End: 2021-06-27
Attending: STUDENT IN AN ORGANIZED HEALTH CARE EDUCATION/TRAINING PROGRAM | Admitting: HOSPITALIST
Payer: COMMERCIAL

## 2021-06-24 VITALS
OXYGEN SATURATION: 96 % | HEIGHT: 65 IN | SYSTOLIC BLOOD PRESSURE: 144 MMHG | HEART RATE: 74 BPM | RESPIRATION RATE: 17 BRPM | DIASTOLIC BLOOD PRESSURE: 101 MMHG | TEMPERATURE: 98 F | WEIGHT: 160.06 LBS

## 2021-06-24 DIAGNOSIS — Z98.89 OTHER SPECIFIED POSTPROCEDURAL STATES: Chronic | ICD-10-CM

## 2021-06-24 DIAGNOSIS — R74.01 ELEVATION OF LEVELS OF LIVER TRANSAMINASE LEVELS: ICD-10-CM

## 2021-06-24 DIAGNOSIS — F10.230 ALCOHOL DEPENDENCE WITH WITHDRAWAL, UNCOMPLICATED: ICD-10-CM

## 2021-06-24 DIAGNOSIS — E83.52 HYPERCALCEMIA: ICD-10-CM

## 2021-06-24 DIAGNOSIS — G47.33 OBSTRUCTIVE SLEEP APNEA (ADULT) (PEDIATRIC): ICD-10-CM

## 2021-06-24 LAB
ALBUMIN SERPL ELPH-MCNC: 4.9 G/DL — SIGNIFICANT CHANGE UP (ref 3.3–5)
ALBUMIN SERPL ELPH-MCNC: 5.3 G/DL — HIGH (ref 3.3–5)
ALP SERPL-CCNC: 83 U/L — SIGNIFICANT CHANGE UP (ref 40–120)
ALP SERPL-CCNC: 93 U/L — SIGNIFICANT CHANGE UP (ref 40–120)
ALT FLD-CCNC: 276 U/L — HIGH (ref 10–45)
ALT FLD-CCNC: 318 U/L — HIGH (ref 10–45)
ANION GAP SERPL CALC-SCNC: 16 MMOL/L — SIGNIFICANT CHANGE UP (ref 5–17)
ANION GAP SERPL CALC-SCNC: 16 MMOL/L — SIGNIFICANT CHANGE UP (ref 5–17)
APTT BLD: 28.8 SEC — SIGNIFICANT CHANGE UP (ref 27.5–35.5)
AST SERPL-CCNC: 130 U/L — HIGH (ref 10–40)
AST SERPL-CCNC: 94 U/L — HIGH (ref 10–40)
BASOPHILS # BLD AUTO: 0.06 K/UL — SIGNIFICANT CHANGE UP (ref 0–0.2)
BASOPHILS NFR BLD AUTO: 1.1 % — SIGNIFICANT CHANGE UP (ref 0–2)
BILIRUB DIRECT SERPL-MCNC: 0.3 MG/DL — HIGH (ref 0–0.2)
BILIRUB DIRECT SERPL-MCNC: 0.3 MG/DL — HIGH (ref 0–0.2)
BILIRUB INDIRECT FLD-MCNC: 1 MG/DL — SIGNIFICANT CHANGE UP (ref 0.2–1)
BILIRUB SERPL-MCNC: 1.1 MG/DL — SIGNIFICANT CHANGE UP (ref 0.2–1.2)
BILIRUB SERPL-MCNC: 1.3 MG/DL — HIGH (ref 0.2–1.2)
BUN SERPL-MCNC: 15 MG/DL — SIGNIFICANT CHANGE UP (ref 7–23)
BUN SERPL-MCNC: 16 MG/DL — SIGNIFICANT CHANGE UP (ref 7–23)
CALCIUM SERPL-MCNC: 10.7 MG/DL — HIGH (ref 8.4–10.5)
CALCIUM SERPL-MCNC: 10.7 MG/DL — HIGH (ref 8.4–10.5)
CHLORIDE SERPL-SCNC: 100 MMOL/L — SIGNIFICANT CHANGE UP (ref 96–108)
CHLORIDE SERPL-SCNC: 99 MMOL/L — SIGNIFICANT CHANGE UP (ref 96–108)
CO2 SERPL-SCNC: 27 MMOL/L — SIGNIFICANT CHANGE UP (ref 22–31)
CO2 SERPL-SCNC: 27 MMOL/L — SIGNIFICANT CHANGE UP (ref 22–31)
CREAT SERPL-MCNC: 0.9 MG/DL — SIGNIFICANT CHANGE UP (ref 0.5–1.3)
CREAT SERPL-MCNC: 0.92 MG/DL — SIGNIFICANT CHANGE UP (ref 0.5–1.3)
EOSINOPHIL # BLD AUTO: 0.23 K/UL — SIGNIFICANT CHANGE UP (ref 0–0.5)
EOSINOPHIL NFR BLD AUTO: 4.2 % — SIGNIFICANT CHANGE UP (ref 0–6)
ETHANOL SERPL-MCNC: SIGNIFICANT CHANGE UP MG/DL (ref 0–10)
GGT SERPL-CCNC: 303 U/L — HIGH (ref 9–50)
GLUCOSE SERPL-MCNC: 100 MG/DL — HIGH (ref 70–99)
GLUCOSE SERPL-MCNC: 147 MG/DL — HIGH (ref 70–99)
HCT VFR BLD CALC: 49.4 % — SIGNIFICANT CHANGE UP (ref 39–50)
HGB BLD-MCNC: 17 G/DL — SIGNIFICANT CHANGE UP (ref 13–17)
IMM GRANULOCYTES NFR BLD AUTO: 0.4 % — SIGNIFICANT CHANGE UP (ref 0–1.5)
INR BLD: 1.08 RATIO — SIGNIFICANT CHANGE UP (ref 0.88–1.16)
LYMPHOCYTES # BLD AUTO: 1.1 K/UL — SIGNIFICANT CHANGE UP (ref 1–3.3)
LYMPHOCYTES # BLD AUTO: 20.1 % — SIGNIFICANT CHANGE UP (ref 13–44)
MAGNESIUM SERPL-MCNC: 2.3 MG/DL — SIGNIFICANT CHANGE UP (ref 1.6–2.6)
MCHC RBC-ENTMCNC: 34.3 PG — HIGH (ref 27–34)
MCHC RBC-ENTMCNC: 34.4 GM/DL — SIGNIFICANT CHANGE UP (ref 32–36)
MCV RBC AUTO: 99.6 FL — SIGNIFICANT CHANGE UP (ref 80–100)
MONOCYTES # BLD AUTO: 0.47 K/UL — SIGNIFICANT CHANGE UP (ref 0–0.9)
MONOCYTES NFR BLD AUTO: 8.6 % — SIGNIFICANT CHANGE UP (ref 2–14)
NEUTROPHILS # BLD AUTO: 3.58 K/UL — SIGNIFICANT CHANGE UP (ref 1.8–7.4)
NEUTROPHILS NFR BLD AUTO: 65.6 % — SIGNIFICANT CHANGE UP (ref 43–77)
NRBC # BLD: 0 /100 WBCS — SIGNIFICANT CHANGE UP (ref 0–0)
PHOSPHATE SERPL-MCNC: 3 MG/DL — SIGNIFICANT CHANGE UP (ref 2.5–4.5)
PLATELET # BLD AUTO: 263 K/UL — SIGNIFICANT CHANGE UP (ref 150–400)
POTASSIUM SERPL-MCNC: 4.5 MMOL/L — SIGNIFICANT CHANGE UP (ref 3.5–5.3)
POTASSIUM SERPL-MCNC: 4.5 MMOL/L — SIGNIFICANT CHANGE UP (ref 3.5–5.3)
POTASSIUM SERPL-SCNC: 4.5 MMOL/L — SIGNIFICANT CHANGE UP (ref 3.5–5.3)
POTASSIUM SERPL-SCNC: 4.5 MMOL/L — SIGNIFICANT CHANGE UP (ref 3.5–5.3)
PROT SERPL-MCNC: 8.1 G/DL — SIGNIFICANT CHANGE UP (ref 6–8.3)
PROT SERPL-MCNC: 8.3 G/DL — SIGNIFICANT CHANGE UP (ref 6–8.3)
PROTHROM AB SERPL-ACNC: 12.9 SEC — SIGNIFICANT CHANGE UP (ref 10.6–13.6)
RBC # BLD: 4.96 M/UL — SIGNIFICANT CHANGE UP (ref 4.2–5.8)
RBC # FLD: 12.5 % — SIGNIFICANT CHANGE UP (ref 10.3–14.5)
SARS-COV-2 RNA SPEC QL NAA+PROBE: SIGNIFICANT CHANGE UP
SODIUM SERPL-SCNC: 142 MMOL/L — SIGNIFICANT CHANGE UP (ref 135–145)
SODIUM SERPL-SCNC: 143 MMOL/L — SIGNIFICANT CHANGE UP (ref 135–145)
WBC # BLD: 5.46 K/UL — SIGNIFICANT CHANGE UP (ref 3.8–10.5)
WBC # FLD AUTO: 5.46 K/UL — SIGNIFICANT CHANGE UP (ref 3.8–10.5)

## 2021-06-24 PROCEDURE — 71045 X-RAY EXAM CHEST 1 VIEW: CPT | Mod: 26

## 2021-06-24 PROCEDURE — 99223 1ST HOSP IP/OBS HIGH 75: CPT

## 2021-06-24 PROCEDURE — 93010 ELECTROCARDIOGRAM REPORT: CPT

## 2021-06-24 PROCEDURE — 99285 EMERGENCY DEPT VISIT HI MDM: CPT

## 2021-06-24 RX ORDER — ONDANSETRON 8 MG/1
4 TABLET, FILM COATED ORAL EVERY 8 HOURS
Refills: 0 | Status: DISCONTINUED | OUTPATIENT
Start: 2021-06-24 | End: 2021-06-27

## 2021-06-24 RX ORDER — ONDANSETRON 8 MG/1
1 TABLET, FILM COATED ORAL EVERY 8 HOURS
Refills: 0 | Status: DISCONTINUED | OUTPATIENT
Start: 2021-06-24 | End: 2021-06-24

## 2021-06-24 RX ORDER — SODIUM CHLORIDE 9 MG/ML
500 INJECTION, SOLUTION INTRAVENOUS
Refills: 0 | Status: DISCONTINUED | OUTPATIENT
Start: 2021-06-24 | End: 2021-06-27

## 2021-06-24 RX ORDER — NICOTINE POLACRILEX 2 MG
1 GUM BUCCAL DAILY
Refills: 0 | Status: DISCONTINUED | OUTPATIENT
Start: 2021-06-24 | End: 2021-06-27

## 2021-06-24 RX ADMIN — Medication 2 MILLIGRAM(S): at 21:41

## 2021-06-24 RX ADMIN — Medication 2 MILLIGRAM(S): at 13:42

## 2021-06-24 RX ADMIN — SODIUM CHLORIDE 100 MILLILITER(S): 9 INJECTION, SOLUTION INTRAVENOUS at 15:04

## 2021-06-24 RX ADMIN — Medication 2 MILLIGRAM(S): at 17:44

## 2021-06-24 RX ADMIN — Medication 50 MILLIGRAM(S): at 09:17

## 2021-06-24 RX ADMIN — Medication 1 PATCH: at 21:41

## 2021-06-24 NOTE — H&P ADULT - NSHPLABSRESULTS_GEN_ALL_CORE
Labs personally reviewed:                          17.0   5.46  )-----------( 263      ( 24 Jun 2021 08:12 )             49.4       06-24    142  |  99  |  16  ----------------------------<  147<H>  4.5   |  27  |  0.90    Ca    10.7<H>      24 Jun 2021 08:12    TPro  8.3  /  Alb  5.3<H>  /  TBili  1.1  /  DBili  0.3<H>  /  AST  130<H>  /  ALT  318<H>  /  AlkPhos  93  06-24      PT/INR - ( 24 Jun 2021 08:12 )   PT: 12.9 sec;   INR: 1.08 ratio         PTT - ( 24 Jun 2021 08:12 )  PTT:28.8 sec

## 2021-06-24 NOTE — ED PROVIDER NOTE - PHYSICAL EXAMINATION
Const: Well-nourished, Well-developed, appearing stated age.  Eyes: no conjunctival injection, no scleral icterus.   HEENT: Head NCAT, no lesions. Atraumatic external nose and ears. Moist MM.  Neck: Symmetric, trachea midline.   CVS: +S1/S2, Peripheral pulses 2+ and equal in all extremities.  RESP: Unlabored respiratory effort. Clear to auscultation bilaterally.  GI: Nontender/Nondistended, No CVA tenderness b/l.   MSK: Normocephalic/Atraumatic, Lower Extremities w/o calf tenderness or edema b/l.   Skin: Warm, dry and intact.   Neuro: CNs II-XII grossly intact. Motor & Sensation grossly intact.  Psych: Awake, Alert, & Oriented (AAO) x3. Appropriate mood and affect.

## 2021-06-24 NOTE — ED PROVIDER NOTE - PMH
Deviated nasal septum    Dyslipidemia    Fatty liver    Hypertrophy of tonsils    Nasal turbinate hypertrophy    SERENE on CPAP

## 2021-06-24 NOTE — H&P ADULT - NSICDXPASTMEDICALHX_GEN_ALL_CORE_FT
PAST MEDICAL HISTORY:  Deviated nasal septum     Dyslipidemia     Fatty liver     Hypertrophy of tonsils     Nasal turbinate hypertrophy     SERENE on CPAP

## 2021-06-24 NOTE — ED ADULT NURSE NOTE - OBJECTIVE STATEMENT
PT is a 46 year old male from home with elevated liver enzymes. Pt was waking up and vomiting in the am.  Pt presents today for admission.  Pt is alert and oriented x 3.  Pt is a daily whiskey drinker.  Last drink was last night.  Pt denies any sob or chest pain.  Pt respirations even and unlabored.  Pt skin warm dry and intact.  Pt lined and labs sent.

## 2021-06-24 NOTE — ED PROVIDER NOTE - CLINICAL SUMMARY MEDICAL DECISION MAKING FREE TEXT BOX
ap- 46M hx of HLD, predm, SERENE on CPAP, daily alcohol use for 9 years drinks 1/3 a bottle of whiskey 1.75L daily, no hx of alcohol withdrawal seizures, pt states that he was told to come to the Er due to worsening LFts, Pt denies any alcohol free days in the past year. he reports that he has quit for 1-2 days at a time. Pt reports no fevers, no chills, he has no asterixis. No cp, no sob, no lightheadedness, no dizziness. no abdominal distention, no abdominal pain, no vomiting. Pt w/ mild scleral icterus, no tongue fasiculiations, he has clear lungs, soft abdomen. no lower extremity edema. Plan for labs, ekg xray and admission to medicine. will discuss w/ Dr. Carranza. Concern for mildly decompensated chronic liver disease. ap- 46M hx of HLD, predm, SERENE on CPAP, daily alcohol use for 9 years drinks 1/3 a bottle of whiskey 1.75L daily, no hx of alcohol withdrawal seizures, pt states that he was told to come to the Er due to worsening LFts, Pt denies any alcohol free days in the past year. he reports that he has quit for 1-2 days at a time. Pt reports no fevers, no chills, he has no asterixis. No cp, no sob, no lightheadedness, no dizziness. no abdominal distention, no abdominal pain, no vomiting. Pt w/ mild scleral icterus, no tongue fasiculiations, he has clear lungs, soft abdomen. no lower extremity edema. Plan for labs, ekg xray and admission to medicine. will discuss w/ Dr. Carranza. Concern for mildly decompensated chronic liver disease. no features to suggest SBP, pt w/ mild tremulousness, possible mild withdrawal, will tx w/ benzo

## 2021-06-24 NOTE — H&P ADULT - NSHPREVIEWOFSYSTEMS_GEN_ALL_CORE
REVIEW OF SYSTEMS:    CONSTITUTIONAL: No weakness, fevers or chills  EYES: no blurry vision or eye pain.   ENT: No throat pain. No dysphagia.    NECK: No pain or stiffness  RESPIRATORY: No cough, wheezing, hemoptysis; No shortness of breath  CARDIOVASCULAR: No chest pain or palpitations.  GASTROINTESTINAL: No abdominal pain. + nausea,+ vomiting; No diarrhea or constipation. No melena or hematochezia.  GENITOURINARY: No dysuria, frequency or hematuria  NEUROLOGICAL: No numbness or weakness. No dizziness or falls.   SKIN: No itching, burning, rashes, or lesions.   LYMPHATIC: No masses or swelling.   All other review of systems is negative unless indicated above.

## 2021-06-24 NOTE — H&P ADULT - NSHPSOCIALHISTORY_GEN_ALL_CORE
smokes 1/2 PPD for 10 years, and 1/3 a bottle of whiskey 1.75L daily ~ 9-10 years. denies illicit drugs use

## 2021-06-24 NOTE — H&P ADULT - PROBLEM SELECTOR PLAN 2
likely dehydration 2/2 poor po intake last few weeks 2/2 n/v, likely due to alcohol abuse   -  ml/hr x 10 hrs  - zofran PRN  - Per outpt chart review, poss EGD if n/v persists  - consider hepatology consult if symptoms persist

## 2021-06-24 NOTE — H&P ADULT - NSICDXPASTSURGICALHX_GEN_ALL_CORE_FT
PAST SURGICAL HISTORY:  S/P correction of deviated nasal septum 2007    S/P repair of hydrocele right

## 2021-06-24 NOTE — H&P ADULT - ASSESSMENT
46 y.o male with PMHx of HLD, Pre-DM, SERENE on CPAP and alcohol abuse was advised to come to the hospital by Dr. Carranza for worsening LFTs, admitted for alcohol withdrawal

## 2021-06-24 NOTE — ED PROVIDER NOTE - OBJECTIVE STATEMENT
46M with of sleep apnea on CPAP and etoh dependance sent in today by his hepatologist (Dr. Carranza). Pt has been drinking 1 handle of whiskey every 3 days for the past 3 years. Started following with hepatology a few months ago when he started experiencing daily nausea/vomiting/weakness. LFTs have been trending up. Patient is still drinking daily, last drink was last night - no hx of withdrawal. Per collateral from wife - sent in by Dr. Carranza for inpatient detox, TBA to Dr. Kinsey Ortiz.

## 2021-06-24 NOTE — H&P ADULT - HISTORY OF PRESENT ILLNESS
46 y.o male with PMHx of HLD, Pre-DM, SERENE on CPAP and alcohol abuse states was told to come to the hospital by Dr. Carranza for worsening LFTs. The last 9 years he has been drinking 1/3 a bottle of whiskey 1.75L daily. He states for the last few weeks he has had increased n/v in the mornings. Per d/w Patient's sister Yanely patient has presented to the hospital for alcohol abuse, concern for withdrawal. He denies h/o alcohol withdrawal seizures and prior h/o intubation. Pt denies any alcohol free days in the past year. He reports that he has quit for 1-2 days at a time. Pt reports no fevers, no chills, he has no asterixis. No cp, no sob, no lightheadedness, no dizziness. States he occasionally has mild abd pain, currently w/o abd pain.     In the ED s/p librium 50 mg x1

## 2021-06-24 NOTE — H&P ADULT - PROBLEM SELECTOR PLAN 1
Per d/w wife (NP) and sister (NP) main concern for presentation is alcohol withdrawal and not transaminitis   started on ativan taper  CIWA score 4  c/w CIWA protocol   SW consult

## 2021-06-25 DIAGNOSIS — E87.2 ACIDOSIS: ICD-10-CM

## 2021-06-25 LAB
ALBUMIN SERPL ELPH-MCNC: 4.7 G/DL — SIGNIFICANT CHANGE UP (ref 3.3–5)
ALP SERPL-CCNC: 86 U/L — SIGNIFICANT CHANGE UP (ref 40–120)
ALT FLD-CCNC: 223 U/L — HIGH (ref 10–45)
ANION GAP SERPL CALC-SCNC: 16 MMOL/L — SIGNIFICANT CHANGE UP (ref 5–17)
ANION GAP SERPL CALC-SCNC: 17 MMOL/L — SIGNIFICANT CHANGE UP (ref 5–17)
AST SERPL-CCNC: 78 U/L — HIGH (ref 10–40)
BILIRUB SERPL-MCNC: 0.8 MG/DL — SIGNIFICANT CHANGE UP (ref 0.2–1.2)
BUN SERPL-MCNC: 18 MG/DL — SIGNIFICANT CHANGE UP (ref 7–23)
BUN SERPL-MCNC: 19 MG/DL — SIGNIFICANT CHANGE UP (ref 7–23)
CALCIUM SERPL-MCNC: 10.2 MG/DL — SIGNIFICANT CHANGE UP (ref 8.4–10.5)
CALCIUM SERPL-MCNC: 9.9 MG/DL — SIGNIFICANT CHANGE UP (ref 8.4–10.5)
CHLORIDE SERPL-SCNC: 99 MMOL/L — SIGNIFICANT CHANGE UP (ref 96–108)
CHLORIDE SERPL-SCNC: 99 MMOL/L — SIGNIFICANT CHANGE UP (ref 96–108)
CO2 SERPL-SCNC: 19 MMOL/L — LOW (ref 22–31)
CO2 SERPL-SCNC: 20 MMOL/L — LOW (ref 22–31)
COVID-19 SPIKE DOMAIN AB INTERP: POSITIVE
COVID-19 SPIKE DOMAIN ANTIBODY RESULT: >250 U/ML — HIGH
CREAT SERPL-MCNC: 0.78 MG/DL — SIGNIFICANT CHANGE UP (ref 0.5–1.3)
CREAT SERPL-MCNC: 0.85 MG/DL — SIGNIFICANT CHANGE UP (ref 0.5–1.3)
GLUCOSE SERPL-MCNC: 104 MG/DL — HIGH (ref 70–99)
GLUCOSE SERPL-MCNC: 113 MG/DL — HIGH (ref 70–99)
HAV IGM SER-ACNC: SIGNIFICANT CHANGE UP
HBV CORE IGM SER-ACNC: SIGNIFICANT CHANGE UP
HBV SURFACE AG SER-ACNC: SIGNIFICANT CHANGE UP
HCT VFR BLD CALC: 47.4 % — SIGNIFICANT CHANGE UP (ref 39–50)
HCV AB S/CO SERPL IA: 0.25 S/CO — SIGNIFICANT CHANGE UP (ref 0–0.99)
HCV AB SERPL-IMP: SIGNIFICANT CHANGE UP
HGB BLD-MCNC: 16.2 G/DL — SIGNIFICANT CHANGE UP (ref 13–17)
MAGNESIUM SERPL-MCNC: 2 MG/DL — SIGNIFICANT CHANGE UP (ref 1.6–2.6)
MCHC RBC-ENTMCNC: 33.3 PG — SIGNIFICANT CHANGE UP (ref 27–34)
MCHC RBC-ENTMCNC: 34.2 GM/DL — SIGNIFICANT CHANGE UP (ref 32–36)
MCV RBC AUTO: 97.5 FL — SIGNIFICANT CHANGE UP (ref 80–100)
NRBC # BLD: 0 /100 WBCS — SIGNIFICANT CHANGE UP (ref 0–0)
PHOSPHATE SERPL-MCNC: 4.3 MG/DL — SIGNIFICANT CHANGE UP (ref 2.5–4.5)
PLATELET # BLD AUTO: 252 K/UL — SIGNIFICANT CHANGE UP (ref 150–400)
POTASSIUM SERPL-MCNC: 4.3 MMOL/L — SIGNIFICANT CHANGE UP (ref 3.5–5.3)
POTASSIUM SERPL-MCNC: 4.3 MMOL/L — SIGNIFICANT CHANGE UP (ref 3.5–5.3)
POTASSIUM SERPL-SCNC: 4.3 MMOL/L — SIGNIFICANT CHANGE UP (ref 3.5–5.3)
POTASSIUM SERPL-SCNC: 4.3 MMOL/L — SIGNIFICANT CHANGE UP (ref 3.5–5.3)
PROT SERPL-MCNC: 7.6 G/DL — SIGNIFICANT CHANGE UP (ref 6–8.3)
RBC # BLD: 4.86 M/UL — SIGNIFICANT CHANGE UP (ref 4.2–5.8)
RBC # FLD: 12 % — SIGNIFICANT CHANGE UP (ref 10.3–14.5)
SARS-COV-2 IGG+IGM SERPL QL IA: >250 U/ML — HIGH
SARS-COV-2 IGG+IGM SERPL QL IA: POSITIVE
SODIUM SERPL-SCNC: 135 MMOL/L — SIGNIFICANT CHANGE UP (ref 135–145)
SODIUM SERPL-SCNC: 135 MMOL/L — SIGNIFICANT CHANGE UP (ref 135–145)
WBC # BLD: 6.11 K/UL — SIGNIFICANT CHANGE UP (ref 3.8–10.5)
WBC # FLD AUTO: 6.11 K/UL — SIGNIFICANT CHANGE UP (ref 3.8–10.5)

## 2021-06-25 PROCEDURE — 99233 SBSQ HOSP IP/OBS HIGH 50: CPT

## 2021-06-25 RX ORDER — THIAMINE MONONITRATE (VIT B1) 100 MG
100 TABLET ORAL DAILY
Refills: 0 | Status: DISCONTINUED | OUTPATIENT
Start: 2021-06-25 | End: 2021-06-27

## 2021-06-25 RX ORDER — FOLIC ACID 0.8 MG
1 TABLET ORAL DAILY
Refills: 0 | Status: DISCONTINUED | OUTPATIENT
Start: 2021-06-25 | End: 2021-06-27

## 2021-06-25 RX ADMIN — Medication 1.5 MILLIGRAM(S): at 18:33

## 2021-06-25 RX ADMIN — Medication 1 TABLET(S): at 18:33

## 2021-06-25 RX ADMIN — Medication 1 MILLIGRAM(S): at 13:16

## 2021-06-25 RX ADMIN — Medication 2 MILLIGRAM(S): at 02:16

## 2021-06-25 RX ADMIN — Medication 1.5 MILLIGRAM(S): at 22:07

## 2021-06-25 RX ADMIN — ONDANSETRON 4 MILLIGRAM(S): 8 TABLET, FILM COATED ORAL at 05:57

## 2021-06-25 RX ADMIN — Medication 100 MILLIGRAM(S): at 13:16

## 2021-06-25 RX ADMIN — Medication 1 PATCH: at 12:18

## 2021-06-25 RX ADMIN — Medication 1 PATCH: at 21:09

## 2021-06-25 RX ADMIN — Medication 2 MILLIGRAM(S): at 06:32

## 2021-06-25 RX ADMIN — Medication 1.5 MILLIGRAM(S): at 13:16

## 2021-06-25 RX ADMIN — Medication 1 PATCH: at 19:33

## 2021-06-25 RX ADMIN — Medication 1.5 MILLIGRAM(S): at 10:00

## 2021-06-25 NOTE — SBIRT NOTE ADULT - NSSBIRTALCPASSREFTXDET_GEN_A_CORE
Substance use was explored further with patient. Patient reported he is having about 10 shots of whiskey daily. Patient reported that once he has started drinking it is difficult for him to stop. Patient reported that he has experienced blackouts due to this alcohol use. Patient reported he has guilt and remorse due to his drinking. Patient reported his children do not know about his drinking, as he does not drink in front of them and he feels his spouse has noticed his drinking. LMSW discussed patients alcohol use and motivation for change further and offered resources. Patient accepted resource packet and reported he will explore if he would like to attend a program. Patient reported that he does not want to go to an inpatient facility. LMSW offered support and encouraged patient to reach out to  if he would like any further assistance.

## 2021-06-25 NOTE — PROGRESS NOTE ADULT - SUBJECTIVE AND OBJECTIVE BOX
PROGRESS NOTE:   Rhona Estrada DO  Hospitalist  Pager [phone]  After 5pm/weekends or if no answer ext: [phone]      Patient is a 46y old  Male who presents with a chief complaint of alcohol withdrawal, transaminitis (24 Jun 2021 12:17)      SUBJECTIVE / OVERNIGHT EVENTS: CIWA 3, mild tremor and no n/v    ADDITIONAL REVIEW OF SYSTEMS:  no fever or chills  no n/v/d    MEDICATIONS  (STANDING):  folic acid 1 milliGRAM(s) Oral daily  lactated ringers. 500 milliLiter(s) (100 mL/Hr) IV Continuous <Continuous>  LORazepam     Tablet   Oral   LORazepam     Tablet 1.5 milliGRAM(s) Oral every 4 hours  multivitamin 1 Tablet(s) Oral daily  nicotine -  14 mG/24Hr(s) Patch 1 patch Transdermal daily  thiamine 100 milliGRAM(s) Oral daily    MEDICATIONS  (PRN):  ondansetron Injectable 4 milliGRAM(s) IV Push every 8 hours PRN Nausea and/or Vomiting      CAPILLARY BLOOD GLUCOSE        I&O's Summary    24 Jun 2021 07:01  -  25 Jun 2021 07:00  --------------------------------------------------------  IN: 1360 mL / OUT: 0 mL / NET: 1360 mL        PHYSICAL EXAM:  Vital Signs Last 24 Hrs  T(C): 36.7 (25 Jun 2021 09:53), Max: 36.9 (24 Jun 2021 14:42)  T(F): 98.1 (25 Jun 2021 09:53), Max: 98.4 (24 Jun 2021 14:42)  HR: 100 (25 Jun 2021 09:53) (81 - 100)  BP: 139/93 (25 Jun 2021 09:53) (116/79 - 139/93)  BP(mean): --  RR: 17 (25 Jun 2021 09:53) (17 - 18)  SpO2: 97% (25 Jun 2021 09:53) (96% - 98%)    CONSTITUTIONAL: NAD, well-developed; non toxic but tremulous  RESPIRATORY: Normal respiratory effort; lungs are clear to auscultation bilaterally  CARDIOVASCULAR: Regular rate and rhythm, normal S1 and S2, no murmur/rub/gallop; No lower extremity edema; Peripheral pulses are 2+ bilaterally  ABDOMEN: Nontender to palpation, normoactive bowel sounds, no rebound/guarding; No hepatosplenomegaly  MUSCLOSKELETAL: no clubbing or cyanosis of digits; no joint swelling or tenderness to palpation  PSYCH: A+O to person, place, and time; affect appropriate    LABS:                        16.2   6.11  )-----------( 252      ( 25 Jun 2021 07:08 )             47.4     06-25    135  |  99  |  18  ----------------------------<  104<H>  4.3   |  19<L>  |  0.78    Ca    10.2      25 Jun 2021 07:10  Phos  4.3     06-25  Mg     2.0     06-25    TPro  7.6  /  Alb  4.7  /  TBili  0.8  /  DBili  x   /  AST  78<H>  /  ALT  223<H>  /  AlkPhos  86  06-25    PT/INR - ( 24 Jun 2021 08:12 )   PT: 12.9 sec;   INR: 1.08 ratio         PTT - ( 24 Jun 2021 08:12 )  PTT:28.8 sec            RADIOLOGY & ADDITIONAL TESTS:  Results Reviewed:   Imaging Personally Reviewed:  Electrocardiogram Personally Reviewed:    COORDINATION OF CARE:  Care Discussed with Consultants/Other Providers [Y/N]:  Prior or Outpatient Records Reviewed [Y/N]:

## 2021-06-25 NOTE — SBIRT NOTE ADULT - NSSBIRTALCTYPDAY_GEN_A_CORE
10 or more Alert-The patient is alert, awake and responds to voice. The patient is oriented to time, place, and person. The triage nurse is able to obtain subjective information.

## 2021-06-26 DIAGNOSIS — K08.89 OTHER SPECIFIED DISORDERS OF TEETH AND SUPPORTING STRUCTURES: ICD-10-CM

## 2021-06-26 LAB
ALBUMIN SERPL ELPH-MCNC: 4.8 G/DL — SIGNIFICANT CHANGE UP (ref 3.3–5)
ALP SERPL-CCNC: 73 U/L — SIGNIFICANT CHANGE UP (ref 40–120)
ALT FLD-CCNC: 268 U/L — HIGH (ref 10–45)
ANION GAP SERPL CALC-SCNC: 14 MMOL/L — SIGNIFICANT CHANGE UP (ref 5–17)
AST SERPL-CCNC: 163 U/L — HIGH (ref 10–40)
BILIRUB SERPL-MCNC: 1.3 MG/DL — HIGH (ref 0.2–1.2)
BUN SERPL-MCNC: 12 MG/DL — SIGNIFICANT CHANGE UP (ref 7–23)
CALCIUM SERPL-MCNC: 10.1 MG/DL — SIGNIFICANT CHANGE UP (ref 8.4–10.5)
CHLORIDE SERPL-SCNC: 101 MMOL/L — SIGNIFICANT CHANGE UP (ref 96–108)
CO2 SERPL-SCNC: 20 MMOL/L — LOW (ref 22–31)
CREAT SERPL-MCNC: 0.82 MG/DL — SIGNIFICANT CHANGE UP (ref 0.5–1.3)
GLUCOSE SERPL-MCNC: 113 MG/DL — HIGH (ref 70–99)
HCT VFR BLD CALC: 46.8 % — SIGNIFICANT CHANGE UP (ref 39–50)
HGB BLD-MCNC: 16 G/DL — SIGNIFICANT CHANGE UP (ref 13–17)
MCHC RBC-ENTMCNC: 33.1 PG — SIGNIFICANT CHANGE UP (ref 27–34)
MCHC RBC-ENTMCNC: 34.2 GM/DL — SIGNIFICANT CHANGE UP (ref 32–36)
MCV RBC AUTO: 96.9 FL — SIGNIFICANT CHANGE UP (ref 80–100)
NRBC # BLD: 0 /100 WBCS — SIGNIFICANT CHANGE UP (ref 0–0)
PLATELET # BLD AUTO: 273 K/UL — SIGNIFICANT CHANGE UP (ref 150–400)
POTASSIUM SERPL-MCNC: 4 MMOL/L — SIGNIFICANT CHANGE UP (ref 3.5–5.3)
POTASSIUM SERPL-SCNC: 4 MMOL/L — SIGNIFICANT CHANGE UP (ref 3.5–5.3)
PROT SERPL-MCNC: 7.4 G/DL — SIGNIFICANT CHANGE UP (ref 6–8.3)
RBC # BLD: 4.83 M/UL — SIGNIFICANT CHANGE UP (ref 4.2–5.8)
RBC # FLD: 12 % — SIGNIFICANT CHANGE UP (ref 10.3–14.5)
SODIUM SERPL-SCNC: 135 MMOL/L — SIGNIFICANT CHANGE UP (ref 135–145)
WBC # BLD: 6.32 K/UL — SIGNIFICANT CHANGE UP (ref 3.8–10.5)
WBC # FLD AUTO: 6.32 K/UL — SIGNIFICANT CHANGE UP (ref 3.8–10.5)

## 2021-06-26 PROCEDURE — 99233 SBSQ HOSP IP/OBS HIGH 50: CPT

## 2021-06-26 RX ORDER — SODIUM CHLORIDE 9 MG/ML
250 INJECTION INTRAMUSCULAR; INTRAVENOUS; SUBCUTANEOUS ONCE
Refills: 0 | Status: COMPLETED | OUTPATIENT
Start: 2021-06-26 | End: 2021-06-26

## 2021-06-26 RX ADMIN — Medication 1 PATCH: at 09:17

## 2021-06-26 RX ADMIN — Medication 1 MILLIGRAM(S): at 09:51

## 2021-06-26 RX ADMIN — Medication 1 MILLIGRAM(S): at 17:50

## 2021-06-26 RX ADMIN — Medication 100 MILLIGRAM(S): at 09:51

## 2021-06-26 RX ADMIN — SODIUM CHLORIDE 500 MILLILITER(S): 9 INJECTION INTRAMUSCULAR; INTRAVENOUS; SUBCUTANEOUS at 14:12

## 2021-06-26 RX ADMIN — Medication 1 MILLIGRAM(S): at 22:00

## 2021-06-26 RX ADMIN — Medication 1.5 MILLIGRAM(S): at 02:36

## 2021-06-26 RX ADMIN — Medication 1.5 MILLIGRAM(S): at 06:16

## 2021-06-26 RX ADMIN — Medication 1 PATCH: at 09:51

## 2021-06-26 RX ADMIN — Medication 1 TABLET(S): at 09:51

## 2021-06-26 RX ADMIN — Medication 1 PATCH: at 10:00

## 2021-06-26 RX ADMIN — Medication 1 MILLIGRAM(S): at 14:11

## 2021-06-26 RX ADMIN — Medication 1 PATCH: at 18:36

## 2021-06-26 NOTE — PROGRESS NOTE ADULT - SUBJECTIVE AND OBJECTIVE BOX
[facility] Division of Hospital Medicine  Benjamin Ruiz MD  Pager (M-F, 8A-5P): [phone]  Other Times:  [phone]    Patient is a 46y old  Male who presents with a chief complaint of alcohol withdrawal, transaminitis (25 Jun 2021 12:56)      SUBJECTIVE / OVERNIGHT EVENTS:    Overnight, no significant acute event reported.   Patient was examined this morning. He feels well. Complaining of right sided toothache which has been ongoing for at least 1 month. Patient has not brushed his teeth for several days.  Denies headache, dizziness, fever, chills, vision changes, auditory/tactile/visual hallucinations, chest pain, N/V, dyspnea, cough, abd pain, dysuria diarrhea, weakness/numbness/tingling. He is tolerating po intake.     ADDITIONAL REVIEW OF SYSTEMS: neg    MEDICATIONS  (STANDING):  folic acid 1 milliGRAM(s) Oral daily  lactated ringers. 500 milliLiter(s) (100 mL/Hr) IV Continuous <Continuous>  LORazepam     Tablet   Oral   LORazepam     Tablet 1 milliGRAM(s) Oral every 4 hours  multivitamin 1 Tablet(s) Oral daily  nicotine -  14 mG/24Hr(s) Patch 1 patch Transdermal daily  thiamine 100 milliGRAM(s) Oral daily    MEDICATIONS  (PRN):  ondansetron Injectable 4 milliGRAM(s) IV Push every 8 hours PRN Nausea and/or Vomiting      CAPILLARY BLOOD GLUCOSE        I&O's Summary    26 Jun 2021 07:01  -  26 Jun 2021 14:53  --------------------------------------------------------  IN: 480 mL / OUT: 0 mL / NET: 480 mL        PHYSICAL EXAM:  Vital Signs Last 24 Hrs  T(C): 36.5 (26 Jun 2021 14:16), Max: 36.8 (26 Jun 2021 02:42)  T(F): 97.7 (26 Jun 2021 14:16), Max: 98.3 (26 Jun 2021 02:42)  HR: 96 (26 Jun 2021 14:16) (71 - 96)  BP: 121/82 (26 Jun 2021 14:16) (121/82 - 137/92)  BP(mean): --  RR: 17 (26 Jun 2021 14:16) (17 - 19)  SpO2: 98% (26 Jun 2021 14:16) (95% - 100%)      CONSTITUTIONAL: NAD, well-developed  EYES: PERRL; conjunctiva and sclera clear  ENMT: Moist oral mucosa, poor oral hygiene poor dentition  RESPIRATORY: Normal respiratory effort; lungs are clear to auscultation bilaterally  CARDIOVASCULAR: Regular rate and rhythm, normal S1 and S2, no murmur/rub/gallop; No lower extremity edema; Peripheral pulses are 2+ bilaterally  ABDOMEN: Nontender to palpation, normoactive bowel sounds, no rebound/guarding;   MUSCULOSKELETAL:  No tremors in extremities. no clubbing or cyanosis of digits; no joint swelling or tenderness to palpation  PSYCH: A+O to person, place, and time; affect appropriate  NEUROLOGY: CN 2-12 are intact and symmetric; no gross sensory/motor deficits         LABS:                        16.0   6.32  )-----------( 273      ( 26 Jun 2021 07:15 )             46.8     06-26    135  |  101  |  12  ----------------------------<  113<H>  4.0   |  20<L>  |  0.82    Ca    10.1      26 Jun 2021 07:14  Phos  4.3     06-25  Mg     2.0     06-25    TPro  7.4  /  Alb  4.8  /  TBili  1.3<H>  /  DBili  x   /  AST  163<H>  /  ALT  268<H>  /  AlkPhos  73  06-26                RADIOLOGY & ADDITIONAL TESTS:  Results Reviewed:   Imaging Personally Reviewed:  Electrocardiogram Personally Reviewed:    COORDINATION OF CARE:  Care Discussed with Consultants/Other Providers [Y/N]:  Prior or Outpatient Records Reviewed [Y/N]:

## 2021-06-27 ENCOUNTER — TRANSCRIPTION ENCOUNTER (OUTPATIENT)
Age: 46
End: 2021-06-27

## 2021-06-27 VITALS
OXYGEN SATURATION: 98 % | RESPIRATION RATE: 18 BRPM | SYSTOLIC BLOOD PRESSURE: 122 MMHG | DIASTOLIC BLOOD PRESSURE: 79 MMHG | HEART RATE: 86 BPM | TEMPERATURE: 98 F

## 2021-06-27 LAB
ALBUMIN SERPL ELPH-MCNC: 4.7 G/DL — SIGNIFICANT CHANGE UP (ref 3.3–5)
ALP SERPL-CCNC: 73 U/L — SIGNIFICANT CHANGE UP (ref 40–120)
ALT FLD-CCNC: 359 U/L — HIGH (ref 10–45)
ANION GAP SERPL CALC-SCNC: 13 MMOL/L — SIGNIFICANT CHANGE UP (ref 5–17)
AST SERPL-CCNC: 225 U/L — HIGH (ref 10–40)
BILIRUB SERPL-MCNC: 1 MG/DL — SIGNIFICANT CHANGE UP (ref 0.2–1.2)
BUN SERPL-MCNC: 12 MG/DL — SIGNIFICANT CHANGE UP (ref 7–23)
CALCIUM SERPL-MCNC: 10 MG/DL — SIGNIFICANT CHANGE UP (ref 8.4–10.5)
CHLORIDE SERPL-SCNC: 102 MMOL/L — SIGNIFICANT CHANGE UP (ref 96–108)
CO2 SERPL-SCNC: 22 MMOL/L — SIGNIFICANT CHANGE UP (ref 22–31)
CREAT SERPL-MCNC: 0.83 MG/DL — SIGNIFICANT CHANGE UP (ref 0.5–1.3)
GLUCOSE SERPL-MCNC: 116 MG/DL — HIGH (ref 70–99)
POTASSIUM SERPL-MCNC: 4.3 MMOL/L — SIGNIFICANT CHANGE UP (ref 3.5–5.3)
POTASSIUM SERPL-SCNC: 4.3 MMOL/L — SIGNIFICANT CHANGE UP (ref 3.5–5.3)
PROT SERPL-MCNC: 7.3 G/DL — SIGNIFICANT CHANGE UP (ref 6–8.3)
SODIUM SERPL-SCNC: 137 MMOL/L — SIGNIFICANT CHANGE UP (ref 135–145)

## 2021-06-27 PROCEDURE — 80076 HEPATIC FUNCTION PANEL: CPT

## 2021-06-27 PROCEDURE — 80074 ACUTE HEPATITIS PANEL: CPT

## 2021-06-27 PROCEDURE — 85025 COMPLETE CBC W/AUTO DIFF WBC: CPT

## 2021-06-27 PROCEDURE — 80048 BASIC METABOLIC PNL TOTAL CA: CPT

## 2021-06-27 PROCEDURE — 80307 DRUG TEST PRSMV CHEM ANLYZR: CPT

## 2021-06-27 PROCEDURE — 82977 ASSAY OF GGT: CPT

## 2021-06-27 PROCEDURE — 86769 SARS-COV-2 COVID-19 ANTIBODY: CPT

## 2021-06-27 PROCEDURE — 83690 ASSAY OF LIPASE: CPT

## 2021-06-27 PROCEDURE — 85730 THROMBOPLASTIN TIME PARTIAL: CPT

## 2021-06-27 PROCEDURE — 84100 ASSAY OF PHOSPHORUS: CPT

## 2021-06-27 PROCEDURE — U0005: CPT

## 2021-06-27 PROCEDURE — 83735 ASSAY OF MAGNESIUM: CPT

## 2021-06-27 PROCEDURE — 85610 PROTHROMBIN TIME: CPT

## 2021-06-27 PROCEDURE — 94660 CPAP INITIATION&MGMT: CPT

## 2021-06-27 PROCEDURE — 82248 BILIRUBIN DIRECT: CPT

## 2021-06-27 PROCEDURE — 85027 COMPLETE CBC AUTOMATED: CPT

## 2021-06-27 PROCEDURE — U0003: CPT

## 2021-06-27 PROCEDURE — 99239 HOSP IP/OBS DSCHRG MGMT >30: CPT

## 2021-06-27 PROCEDURE — 99285 EMERGENCY DEPT VISIT HI MDM: CPT

## 2021-06-27 PROCEDURE — 71045 X-RAY EXAM CHEST 1 VIEW: CPT

## 2021-06-27 PROCEDURE — 80053 COMPREHEN METABOLIC PANEL: CPT

## 2021-06-27 RX ADMIN — Medication 1 PATCH: at 10:11

## 2021-06-27 RX ADMIN — Medication 1 MILLIGRAM(S): at 05:14

## 2021-06-27 RX ADMIN — Medication 100 MILLIGRAM(S): at 12:02

## 2021-06-27 RX ADMIN — Medication 1 PATCH: at 07:30

## 2021-06-27 RX ADMIN — Medication 1 PATCH: at 10:10

## 2021-06-27 RX ADMIN — Medication 0.5 MILLIGRAM(S): at 13:46

## 2021-06-27 RX ADMIN — Medication 0.5 MILLIGRAM(S): at 10:06

## 2021-06-27 RX ADMIN — Medication 1 TABLET(S): at 12:02

## 2021-06-27 RX ADMIN — Medication 1 MILLIGRAM(S): at 12:02

## 2021-06-27 NOTE — PROGRESS NOTE ADULT - ASSESSMENT
46 y.o male with PMHx of HLD, Pre-DM, SERENE on CPAP and alcohol abuse was advised to come to the hospital by Dr. Carranza for worsening LFTs, admitted for alcohol withdrawal. 

## 2021-06-27 NOTE — DISCHARGE NOTE NURSING/CASE MANAGEMENT/SOCIAL WORK - PATIENT PORTAL LINK FT
You can access the FollowMyHealth Patient Portal offered by [facility] by registering at the following website: [email or web address]. By joining [facility]’s FollowMyHealth portal, you will also be able to view your health information using other applications (apps) compatible with our system.

## 2021-06-27 NOTE — DISCHARGE NOTE NURSING/CASE MANAGEMENT/SOCIAL WORK - NSDCPEWEB_GEN_ALL_CORE
[facility] for Tobacco Control website --- [email or web address] website --- [email or web address]

## 2021-06-27 NOTE — PROGRESS NOTE ADULT - PROBLEM SELECTOR PLAN 1
Per wife (NP) and sister (NP) main concern for presentation is alcohol withdrawal and not transaminitis     Continue CIWA protocol. Finished ativan taper  SW consulted  Continue multivitamin, thiamine and folate
Per d/w wife (NP) and sister (NP) main concern for presentation is alcohol withdrawal and not transaminitis   started on ativan taper  CIWA score 4  c/w CIWA protocol and ativan taper until 6/27  SW consulted  multivitamin, thiamine and folate
Per wife (NP) and sister (NP) main concern for presentation is alcohol withdrawal and not transaminitis     Continue CIWA protocol and ativan taper until 6/27  SW consulted  Continue multivitamin, thiamine and folate

## 2021-06-27 NOTE — PROGRESS NOTE ADULT - PROBLEM SELECTOR PLAN 6
Continue to monitor for starvation ketosis and refeeding syndrome  Check Mg/Phos daily    Patient counselled on alcohol and smoking cessation. Discussed findings of liver function test and the need to follow up with Dr. Carranza.   Discharge planning >40minutes. Close outpatient follow up with Dr. Carranza and PCP.       Case and plan discussed with ACP: Magalys
Continue to monitor for starvation ketosis and refeeding syndrome  Check Mg/Phos daily      Continue to eval need for PT, suspect he will not need when his symptoms improve.    Dispo: Likely discharge tomorrow if continues to improve, finishes Ativan taper.  Discharge planning    Case and plan discussed with ACP: Aaron

## 2021-06-27 NOTE — PROGRESS NOTE ADULT - PROBLEM SELECTOR PLAN 4
Right sided tooth pain ongoing for 1 month  Poor oral hygiene, suspect dental carries. No obvious gumline inflammation     Encourage oral hygiene  Patient to follow up outpatient with dentist
Cpap at night    Continue to eval need for PT, suspect he will not need when his symptoms improve.    Discussed with wife
Right sided tooth pain ongoing for 1 month  Poor oral hygiene, suspect dental carries. No obvious gumline inflammation     Encourage oral hygiene  Patient to follow up outpatient with dentist

## 2021-06-27 NOTE — PROGRESS NOTE ADULT - REASON FOR ADMISSION
alcohol withdrawal, transaminitis

## 2021-06-27 NOTE — DISCHARGE NOTE PROVIDER - NSDCCPCAREPLAN_GEN_ALL_CORE_FT
PRINCIPAL DISCHARGE DIAGNOSIS  Diagnosis: Alcohol withdrawal syndrome without complication  Assessment and Plan of Treatment: Alcohol withdrawal syndrome without complication  Alcohol Use Disorder  WHAT YOU NEED TO KNOW:  Alcohol use disorder (AUD) is problem drinking. AUD includes alcohol abuse and alcohol dependency.   DISCHARGE INSTRUCTIONS:  Seek care immediately if:   Your heart is beating faster than usual.  You have hallucinations.You cannot remember what happens while you are drinking.You have seizures.  Contact your healthcare provider if: You are anxious and have nausea.Your hands are shaky and you are sweating heavily.  You have questions or concerns about your condition or care.  Follow up with your healthcare provider as directed: Do not try to stop drinking on your own. Your healthcare provider may need to help you withdraw from alcohol safely. He may need to admit you to the hospital. You may also need any of the following treatments:  Medicines to decrease your craving for alcohol  Support groups such as Alcoholics Anonymous   Therapy from a psychiatrist or psychologist   Admission to an inpatient facility for treatment for severe AUD  Interested in discussing options to reduce your alcohol or drug use?    [facility]: [phone]   [facility] Substance Abuse Services: [phone], option #2   Methadone Maintenance & Ambulatory Opiate Detox: [phone]  [facility]: [phone]  [facility]: [phone]  [facility]: [phone]  [facility]: [phone], option #2   [facility]: [phone]  [facility]: [phone]  [facility] Int[facility]: [phone]  [facility] Chemical Dependency/Ancillary Withdrawal: [phone]  [facility] Methadone Maintenance: [phone]  [facility]: [phone]  [facility]: [phone]  [facility]: 8-245-6-[facility] Office of Alcoholism and Substance Abuse Services ([place]ASAS): https://www.[place]providerdirectory/  [facility]      SECONDARY DISCHARGE DIAGNOSES  Diagnosis: Transaminitis  Assessment and Plan of Treatment: Improving follow up with Dr. Carranza outpatient    Diagnosis: Hypercalcemia  Assessment and Plan of Treatment: Hypercalcemia  resolved

## 2021-06-27 NOTE — DISCHARGE NOTE PROVIDER - HOSPITAL COURSE
46 y.o male with PMHx of HLD, Pre-DM, SERENE on CPAP and alcohol abuse was advised to come to the hospital by Dr. Carranza for worsening LFTs, admitted for alcohol withdrawal.     Alcohol withdrawal syndrome without complication.  Plan: Per wife (NP) and sister (NP) main concern for presentation is alcohol withdrawal and not transaminitis     Continue CIWA protocol and ativan taper until 6/27  SW consulted  Continue multivitamin, thiamine and folate.      Hypercalcemia.  Plan: resolved with hydration   - Zofran PRN.      Transaminitis.  Plan: Appears overall stable, from prior LFTs per HIE review now downtrending  Tbili wnl   Coags wnl   Monitor CMP  No need for formal Hepatology eval inpt unless LFTs rise again, follow up as outpt for LFTs after discharge  Discussed with Dr. Carranza who sees him as outpt.      Tooth pain.  Plan: Right sided tooth pain ongoing for 1 month  Poor oral hygiene, suspect dental carries. No obvious gumline inflammation     Encourage oral hygiene  Patient to follow up outpatient with dentist.     SERENE on CPAP.  Plan: CPAP at night.     Metabolic acidosis. Plan: Continue to monitor for starvation ketosis and refeeding syndrome  Check Mg/Phos daily    Received covid vaccine Moderna dose 1 and 2 29-Apr-2021

## 2021-06-27 NOTE — PROGRESS NOTE ADULT - SUBJECTIVE AND OBJECTIVE BOX
[facility] Division of Hospital Medicine  Benjamin Ruiz MD  Pager (EARNESTINE-F, 8A-5P): [phone]  Other Times:  [phone]    Patient is a 46y old  Male who presents with a chief complaint of alcohol withdrawal, transaminitis (27 Jun 2021 13:21)      SUBJECTIVE / OVERNIGHT EVENTS:    Patient was examined this morning. He feels well and wants to go home. Patient counselled on alcohol and smoking cessation. Discussed findings of liver function test and the need to follow up with Dr. Carranza.       ADDITIONAL REVIEW OF SYSTEMS:    MEDICATIONS  (STANDING):  folic acid 1 milliGRAM(s) Oral daily  lactated ringers. 500 milliLiter(s) (100 mL/Hr) IV Continuous <Continuous>  LORazepam     Tablet   Oral   LORazepam     Tablet 0.5 milliGRAM(s) Oral every 4 hours  multivitamin 1 Tablet(s) Oral daily  nicotine -  14 mG/24Hr(s) Patch 1 patch Transdermal daily  thiamine 100 milliGRAM(s) Oral daily    MEDICATIONS  (PRN):  ondansetron Injectable 4 milliGRAM(s) IV Push every 8 hours PRN Nausea and/or Vomiting      CAPILLARY BLOOD GLUCOSE        I&O's Summary    26 Jun 2021 07:01  -  27 Jun 2021 07:00  --------------------------------------------------------  IN: 840 mL / OUT: 0 mL / NET: 840 mL    27 Jun 2021 07:01  -  27 Jun 2021 13:46  --------------------------------------------------------  IN: 240 mL / OUT: 0 mL / NET: 240 mL        PHYSICAL EXAM:  Vital Signs Last 24 Hrs  T(C): 36.8 (27 Jun 2021 11:52), Max: 37.1 (27 Jun 2021 05:08)  T(F): 98.3 (27 Jun 2021 11:52), Max: 98.8 (27 Jun 2021 05:08)  HR: 86 (27 Jun 2021 11:52) (76 - 101)  BP: 122/79 (27 Jun 2021 11:52) (107/73 - 122/79)  BP(mean): --  RR: 18 (27 Jun 2021 11:52) (17 - 18)  SpO2: 98% (27 Jun 2021 11:52) (96% - 100%)      CONSTITUTIONAL: NAD, well-developed  EYES: PERRL; conjunctiva and sclera clear  ENMT: Moist oral mucosa, poor oral hygiene poor dentition  RESPIRATORY: Normal respiratory effort; lungs are clear to auscultation bilaterally  CARDIOVASCULAR: Regular rate and rhythm, normal S1 and S2, no murmur/rub/gallop; No lower extremity edema; Peripheral pulses are 2+ bilaterally  ABDOMEN: Nontender to palpation, normoactive bowel sounds, no rebound/guarding;   MUSCULOSKELETAL:  No tremors in extremities. no clubbing or cyanosis of digits; no joint swelling or tenderness to palpation  PSYCH: A+O to person, place, and time; affect appropriate  NEUROLOGY: CN 2-12 are intact and symmetric; no gross sensory/motor deficits           LABS:                        16.0   6.32  )-----------( 273      ( 26 Jun 2021 07:15 )             46.8     06-27    137  |  102  |  12  ----------------------------<  116<H>  4.3   |  22  |  0.83    Ca    10.0      27 Jun 2021 07:28    TPro  7.3  /  Alb  4.7  /  TBili  1.0  /  DBili  x   /  AST  225<H>  /  ALT  359<H>  /  AlkPhos  73  06-27                RADIOLOGY & ADDITIONAL TESTS:  Results Reviewed:   Imaging Personally Reviewed:  Electrocardiogram Personally Reviewed:    COORDINATION OF CARE:  Care Discussed with Consultants/Other Providers [Y/N]:  Prior or Outpatient Records Reviewed [Y/N]:   [facility] Division of Hospital Medicine  Benjamin Ruiz MD  Pager (EARNESTINE-F, 8A-5P): [phone]  Other Times:  [phone]    Patient is a 46y old  Male who presents with a chief complaint of alcohol withdrawal, transaminitis (27 Jun 2021 13:21)      SUBJECTIVE / OVERNIGHT EVENTS:    Patient was examined this morning. He feels well and wants to go home. Patient counselled on alcohol and smoking cessation. Discussed findings of liver function tests and the need to follow up with Dr. Carranza.       ADDITIONAL REVIEW OF SYSTEMS:    MEDICATIONS  (STANDING):  folic acid 1 milliGRAM(s) Oral daily  lactated ringers. 500 milliLiter(s) (100 mL/Hr) IV Continuous <Continuous>  LORazepam     Tablet   Oral   LORazepam     Tablet 0.5 milliGRAM(s) Oral every 4 hours  multivitamin 1 Tablet(s) Oral daily  nicotine -  14 mG/24Hr(s) Patch 1 patch Transdermal daily  thiamine 100 milliGRAM(s) Oral daily    MEDICATIONS  (PRN):  ondansetron Injectable 4 milliGRAM(s) IV Push every 8 hours PRN Nausea and/or Vomiting      CAPILLARY BLOOD GLUCOSE        I&O's Summary    26 Jun 2021 07:01  -  27 Jun 2021 07:00  --------------------------------------------------------  IN: 840 mL / OUT: 0 mL / NET: 840 mL    27 Jun 2021 07:01  -  27 Jun 2021 13:46  --------------------------------------------------------  IN: 240 mL / OUT: 0 mL / NET: 240 mL        PHYSICAL EXAM:  Vital Signs Last 24 Hrs  T(C): 36.8 (27 Jun 2021 11:52), Max: 37.1 (27 Jun 2021 05:08)  T(F): 98.3 (27 Jun 2021 11:52), Max: 98.8 (27 Jun 2021 05:08)  HR: 86 (27 Jun 2021 11:52) (76 - 101)  BP: 122/79 (27 Jun 2021 11:52) (107/73 - 122/79)  BP(mean): --  RR: 18 (27 Jun 2021 11:52) (17 - 18)  SpO2: 98% (27 Jun 2021 11:52) (96% - 100%)      CONSTITUTIONAL: NAD, well-developed  EYES: PERRL; conjunctiva and sclera clear  ENMT: Moist oral mucosa, poor oral hygiene poor dentition  RESPIRATORY: Normal respiratory effort; lungs are clear to auscultation bilaterally  CARDIOVASCULAR: Regular rate and rhythm, normal S1 and S2, no murmur/rub/gallop; No lower extremity edema; Peripheral pulses are 2+ bilaterally  ABDOMEN: Nontender to palpation, normoactive bowel sounds, no rebound/guarding;   MUSCULOSKELETAL:  No tremors in extremities. no clubbing or cyanosis of digits; no joint swelling or tenderness to palpation  PSYCH: A+O to person, place, and time; affect appropriate  NEUROLOGY: CN 2-12 are intact and symmetric; no gross sensory/motor deficits           LABS:                        16.0   6.32  )-----------( 273      ( 26 Jun 2021 07:15 )             46.8     06-27    137  |  102  |  12  ----------------------------<  116<H>  4.3   |  22  |  0.83    Ca    10.0      27 Jun 2021 07:28    TPro  7.3  /  Alb  4.7  /  TBili  1.0  /  DBili  x   /  AST  225<H>  /  ALT  359<H>  /  AlkPhos  73  06-27                RADIOLOGY & ADDITIONAL TESTS:  Results Reviewed:   Imaging Personally Reviewed:  Electrocardiogram Personally Reviewed:    COORDINATION OF CARE:  Care Discussed with Consultants/Other Providers [Y/N]:  Prior or Outpatient Records Reviewed [Y/N]:

## 2021-06-27 NOTE — DISCHARGE NOTE PROVIDER - CARE PROVIDER_API CALL
Naresh Carranza)  Gastroenterology; Internal Medicine  [address] [place] [address]  Phone: [phone]  Fax: [phone]  Follow Up Time:

## 2021-06-27 NOTE — DISCHARGE NOTE NURSING/CASE MANAGEMENT/SOCIAL WORK - NSDCPEEMAIL_GEN_ALL_CORE
[facility] for Tobacco Control email [email or web address]

## 2021-06-27 NOTE — PROGRESS NOTE ADULT - PROBLEM SELECTOR PLAN 2
resolved with hydration   -  ml/hr x 10 hrs completed   - zofran PRN
Resolved with hydration   - Zofran PRN
resolved with hydration   - Zofran PRN

## 2021-06-27 NOTE — PROGRESS NOTE ADULT - PROBLEM SELECTOR PLAN 3
appears stable from prior Lfts per HIE review now downtrending  Tbili wnl   Coags wnl   monitor CMP  No need for formal Hepatology eval inpt unless LFTs rise again, follow up as outpt for LFTs after Dc  Discussed with Dr. Carranza who see him as outpt
Appears overall stable, from prior LFTs per HIE review now downtrending  Tbili wnl   Coags wnl   Monitor CMP  No need for formal Hepatology eval inpt unless LFTs rise again, follow up as outpt for LFTs after discharge  Discussed with Dr. Carranza who sees him as outpt
LFTs elevated   Tbili wnl   Coags wnl   Monitor CMP  No need for formal Hepatology eval inpt unless LFTs rise again, follow up as outpt for LFTs after discharge  Discussed with Dr. Carranza who sees him as outpt

## 2021-06-27 NOTE — PROGRESS NOTE ADULT - PROBLEM SELECTOR PLAN 5
CPAP at night
CPAP at night
Continue to monitor for starvatin ketosis and refeeding syndrome  Check mg/phos daily

## 2021-06-28 ENCOUNTER — NON-APPOINTMENT (OUTPATIENT)
Age: 46
End: 2021-06-28

## 2021-06-28 DIAGNOSIS — Z00.00 ENCOUNTER FOR GENERAL ADULT MEDICAL EXAMINATION W/OUT ABNORMAL FINDINGS: ICD-10-CM

## 2021-07-09 ENCOUNTER — NON-APPOINTMENT (OUTPATIENT)
Age: 46
End: 2021-07-09

## 2021-07-13 ENCOUNTER — NON-APPOINTMENT (OUTPATIENT)
Age: 46
End: 2021-07-13

## 2021-07-16 ENCOUNTER — NON-APPOINTMENT (OUTPATIENT)
Age: 46
End: 2021-07-16

## 2021-07-16 ENCOUNTER — OUTPATIENT (OUTPATIENT)
Dept: OUTPATIENT SERVICES | Facility: HOSPITAL | Age: 46
LOS: 1 days | Discharge: HOME | End: 2021-07-16

## 2021-07-16 DIAGNOSIS — Z98.89 OTHER SPECIFIED POSTPROCEDURAL STATES: Chronic | ICD-10-CM

## 2021-07-19 DIAGNOSIS — F10.20 ALCOHOL DEPENDENCE, UNCOMPLICATED: ICD-10-CM

## 2021-07-23 ENCOUNTER — NON-APPOINTMENT (OUTPATIENT)
Age: 46
End: 2021-07-23

## 2021-07-27 ENCOUNTER — APPOINTMENT (OUTPATIENT)
Dept: ORTHOPEDIC SURGERY | Facility: CLINIC | Age: 46
End: 2021-07-27
Payer: COMMERCIAL

## 2021-07-27 ENCOUNTER — NON-APPOINTMENT (OUTPATIENT)
Age: 46
End: 2021-07-27

## 2021-07-27 VITALS
HEART RATE: 88 BPM | WEIGHT: 148 LBS | HEIGHT: 65 IN | OXYGEN SATURATION: 98 % | SYSTOLIC BLOOD PRESSURE: 123 MMHG | BODY MASS INDEX: 24.66 KG/M2 | DIASTOLIC BLOOD PRESSURE: 79 MMHG

## 2021-07-27 DIAGNOSIS — M76.51 PATELLAR TENDINITIS, RIGHT KNEE: ICD-10-CM

## 2021-07-27 DIAGNOSIS — M25.561 PAIN IN RIGHT KNEE: ICD-10-CM

## 2021-07-27 PROCEDURE — 99072 ADDL SUPL MATRL&STAF TM PHE: CPT

## 2021-07-27 PROCEDURE — 73564 X-RAY EXAM KNEE 4 OR MORE: CPT | Mod: RT

## 2021-07-27 PROCEDURE — 99203 OFFICE O/P NEW LOW 30 MIN: CPT

## 2021-07-27 NOTE — DISCUSSION/SUMMARY
[de-identified] : Patient was advised of his findings he will rest and use ice as needed.  Patient was prescribed diclofenac.  He declined referral to physical therapy.  Follow-up as needed

## 2021-07-27 NOTE — PHYSICAL EXAM
[de-identified] : Physical examination of the right knee discloses parapatellar tenderness to palpation slightly increased with resisted extension of the right knee.  No acute defects deformities or effusions are present.  Nontender stable range of motion otherwise noted [de-identified] : X-rays taken of the right knee and AP lateral skyline and open notch views are within normal limits.

## 2021-07-27 NOTE — HISTORY OF PRESENT ILLNESS
[Worsening] : worsening [___ mths] : [unfilled] month(s) ago [0] : a minimum pain level of 0/10 [9] : a maximum pain level of 9/10 [Constant] : ~He/She~ states the symptoms seem to be constant [Walking] : worsened by walking [Knee Flexion] : worsened with knee flexion [Heat] : relieved by heat [Rest] : relieved by rest [de-identified] : Pt presents for initial evaluation with pain in his right knee, there is no known injury, pt states he feels the knee is stiff, pt has not taken any medication for pain, pt has clicking and most difficulty descending stairs. [de-identified] : certain movements [de-identified] : compression wrap

## 2021-08-03 ENCOUNTER — APPOINTMENT (OUTPATIENT)
Dept: HEPATOLOGY | Facility: HOSPITAL | Age: 46
End: 2021-08-03

## 2021-08-20 ENCOUNTER — NON-APPOINTMENT (OUTPATIENT)
Age: 46
End: 2021-08-20

## 2021-08-25 ENCOUNTER — NON-APPOINTMENT (OUTPATIENT)
Age: 46
End: 2021-08-25

## 2021-09-07 ENCOUNTER — APPOINTMENT (OUTPATIENT)
Dept: INTERNAL MEDICINE | Facility: CLINIC | Age: 46
End: 2021-09-07

## 2021-09-14 ENCOUNTER — APPOINTMENT (OUTPATIENT)
Dept: INTERNAL MEDICINE | Facility: CLINIC | Age: 46
End: 2021-09-14
Payer: COMMERCIAL

## 2021-09-14 DIAGNOSIS — F17.200 NICOTINE DEPENDENCE, UNSPECIFIED, UNCOMPLICATED: ICD-10-CM

## 2021-09-14 PROCEDURE — 99214 OFFICE O/P EST MOD 30 MIN: CPT | Mod: 95

## 2021-09-16 ENCOUNTER — NON-APPOINTMENT (OUTPATIENT)
Age: 46
End: 2021-09-16

## 2021-10-19 ENCOUNTER — NON-APPOINTMENT (OUTPATIENT)
Age: 46
End: 2021-10-19

## 2022-01-03 DIAGNOSIS — Z20.822 CONTACT WITH AND (SUSPECTED) EXPOSURE TO COVID-19: ICD-10-CM

## 2022-06-13 ENCOUNTER — EMERGENCY (EMERGENCY)
Facility: HOSPITAL | Age: 47
LOS: 1 days | Discharge: HOME | End: 2022-06-13
Attending: EMERGENCY MEDICINE
Payer: COMMERCIAL

## 2022-06-13 VITALS
OXYGEN SATURATION: 100 % | DIASTOLIC BLOOD PRESSURE: 89 MMHG | RESPIRATION RATE: 20 BRPM | HEART RATE: 78 BPM | TEMPERATURE: 98 F | HEIGHT: 65 IN | WEIGHT: 134.92 LBS | SYSTOLIC BLOOD PRESSURE: 129 MMHG

## 2022-06-13 VITALS
OXYGEN SATURATION: 98 % | HEART RATE: 91 BPM | RESPIRATION RATE: 18 BRPM | DIASTOLIC BLOOD PRESSURE: 72 MMHG | TEMPERATURE: 99 F | SYSTOLIC BLOOD PRESSURE: 111 MMHG

## 2022-06-13 DIAGNOSIS — Z98.89 OTHER SPECIFIED POSTPROCEDURAL STATES: Chronic | ICD-10-CM

## 2022-06-13 LAB
ALBUMIN SERPL ELPH-MCNC: 4.9 G/DL — SIGNIFICANT CHANGE UP (ref 3.3–5)
ALP SERPL-CCNC: 86 U/L — SIGNIFICANT CHANGE UP (ref 40–120)
ALT FLD-CCNC: 117 U/L — HIGH (ref 10–45)
ANION GAP SERPL CALC-SCNC: 11 MMOL/L — SIGNIFICANT CHANGE UP (ref 5–17)
APTT BLD: 30 SEC — SIGNIFICANT CHANGE UP (ref 27.5–35.5)
AST SERPL-CCNC: 41 U/L — HIGH (ref 10–40)
BASE EXCESS BLDV CALC-SCNC: 3.6 MMOL/L — HIGH (ref -2–2)
BASOPHILS # BLD AUTO: 0.1 K/UL — SIGNIFICANT CHANGE UP (ref 0–0.2)
BASOPHILS NFR BLD AUTO: 0.8 % — SIGNIFICANT CHANGE UP (ref 0–2)
BILIRUB SERPL-MCNC: 0.4 MG/DL — SIGNIFICANT CHANGE UP (ref 0.2–1.2)
BLD GP AB SCN SERPL QL: NEGATIVE — SIGNIFICANT CHANGE UP
BLOOD GAS VENOUS - CREATININE: SIGNIFICANT CHANGE UP MG/DL (ref 0.5–1.3)
BUN SERPL-MCNC: 11 MG/DL — SIGNIFICANT CHANGE UP (ref 7–23)
CA-I SERPL-SCNC: 1.35 MMOL/L — HIGH (ref 1.15–1.33)
CALCIUM SERPL-MCNC: 10.3 MG/DL — SIGNIFICANT CHANGE UP (ref 8.4–10.5)
CHLORIDE BLDV-SCNC: 100 MMOL/L — SIGNIFICANT CHANGE UP (ref 96–108)
CHLORIDE SERPL-SCNC: 101 MMOL/L — SIGNIFICANT CHANGE UP (ref 96–108)
CO2 BLDV-SCNC: 34 MMOL/L — HIGH (ref 22–26)
CO2 SERPL-SCNC: 26 MMOL/L — SIGNIFICANT CHANGE UP (ref 22–31)
CREAT SERPL-MCNC: 0.95 MG/DL — SIGNIFICANT CHANGE UP (ref 0.5–1.3)
EGFR: 100 ML/MIN/1.73M2 — SIGNIFICANT CHANGE UP
EOSINOPHIL # BLD AUTO: 0.33 K/UL — SIGNIFICANT CHANGE UP (ref 0–0.5)
EOSINOPHIL NFR BLD AUTO: 2.7 % — SIGNIFICANT CHANGE UP (ref 0–6)
GAS PNL BLDV: 136 MMOL/L — SIGNIFICANT CHANGE UP (ref 136–145)
GAS PNL BLDV: SIGNIFICANT CHANGE UP
GAS PNL BLDV: SIGNIFICANT CHANGE UP
GLUCOSE BLDV-MCNC: 104 MG/DL — HIGH (ref 70–99)
GLUCOSE SERPL-MCNC: 105 MG/DL — HIGH (ref 70–99)
HCO3 BLDV-SCNC: 32 MMOL/L — HIGH (ref 22–29)
HCT VFR BLD CALC: 45.8 % — SIGNIFICANT CHANGE UP (ref 39–50)
HCT VFR BLDA CALC: 49 % — SIGNIFICANT CHANGE UP (ref 39–51)
HGB BLD CALC-MCNC: 16.2 G/DL — SIGNIFICANT CHANGE UP (ref 12.6–17.4)
HGB BLD-MCNC: 15.6 G/DL — SIGNIFICANT CHANGE UP (ref 13–17)
IMM GRANULOCYTES NFR BLD AUTO: 0.5 % — SIGNIFICANT CHANGE UP (ref 0–1.5)
INR BLD: 1.28 RATIO — HIGH (ref 0.88–1.16)
LACTATE BLDV-MCNC: 2.6 MMOL/L — HIGH (ref 0.7–2)
LIDOCAIN IGE QN: 117 U/L — HIGH (ref 7–60)
LYMPHOCYTES # BLD AUTO: 1.17 K/UL — SIGNIFICANT CHANGE UP (ref 1–3.3)
LYMPHOCYTES # BLD AUTO: 9.7 % — LOW (ref 13–44)
MCHC RBC-ENTMCNC: 33.8 PG — SIGNIFICANT CHANGE UP (ref 27–34)
MCHC RBC-ENTMCNC: 34.1 GM/DL — SIGNIFICANT CHANGE UP (ref 32–36)
MCV RBC AUTO: 99.3 FL — SIGNIFICANT CHANGE UP (ref 80–100)
MONOCYTES # BLD AUTO: 0.83 K/UL — SIGNIFICANT CHANGE UP (ref 0–0.9)
MONOCYTES NFR BLD AUTO: 6.9 % — SIGNIFICANT CHANGE UP (ref 2–14)
NEUTROPHILS # BLD AUTO: 9.6 K/UL — HIGH (ref 1.8–7.4)
NEUTROPHILS NFR BLD AUTO: 79.4 % — HIGH (ref 43–77)
NRBC # BLD: 0 /100 WBCS — SIGNIFICANT CHANGE UP (ref 0–0)
OTHER CELLS CSF MANUAL: 4.4 ML/DL — LOW (ref 18–22)
PCO2 BLDV: 62 MMHG — HIGH (ref 42–55)
PH BLDV: 7.32 — SIGNIFICANT CHANGE UP (ref 7.32–7.43)
PLATELET # BLD AUTO: 361 K/UL — SIGNIFICANT CHANGE UP (ref 150–400)
PO2 BLDV: 16 MMHG — LOW (ref 25–45)
POTASSIUM BLDV-SCNC: 4.2 MMOL/L — SIGNIFICANT CHANGE UP (ref 3.5–5.1)
POTASSIUM SERPL-MCNC: 4.4 MMOL/L — SIGNIFICANT CHANGE UP (ref 3.5–5.3)
POTASSIUM SERPL-SCNC: 4.4 MMOL/L — SIGNIFICANT CHANGE UP (ref 3.5–5.3)
PROT SERPL-MCNC: 7.6 G/DL — SIGNIFICANT CHANGE UP (ref 6–8.3)
PROTHROM AB SERPL-ACNC: 14.8 SEC — HIGH (ref 10.5–13.4)
RAPID RVP RESULT: SIGNIFICANT CHANGE UP
RBC # BLD: 4.61 M/UL — SIGNIFICANT CHANGE UP (ref 4.2–5.8)
RBC # FLD: 12.1 % — SIGNIFICANT CHANGE UP (ref 10.3–14.5)
RH IG SCN BLD-IMP: POSITIVE — SIGNIFICANT CHANGE UP
SAO2 % BLDV: 19.7 % — LOW (ref 67–88)
SARS-COV-2 RNA SPEC QL NAA+PROBE: SIGNIFICANT CHANGE UP
SODIUM SERPL-SCNC: 138 MMOL/L — SIGNIFICANT CHANGE UP (ref 135–145)
WBC # BLD: 12.09 K/UL — HIGH (ref 3.8–10.5)
WBC # FLD AUTO: 12.09 K/UL — HIGH (ref 3.8–10.5)

## 2022-06-13 PROCEDURE — 84295 ASSAY OF SERUM SODIUM: CPT

## 2022-06-13 PROCEDURE — 83605 ASSAY OF LACTIC ACID: CPT

## 2022-06-13 PROCEDURE — 84132 ASSAY OF SERUM POTASSIUM: CPT

## 2022-06-13 PROCEDURE — 0225U NFCT DS DNA&RNA 21 SARSCOV2: CPT

## 2022-06-13 PROCEDURE — 80307 DRUG TEST PRSMV CHEM ANLYZR: CPT

## 2022-06-13 PROCEDURE — 85730 THROMBOPLASTIN TIME PARTIAL: CPT

## 2022-06-13 PROCEDURE — 86901 BLOOD TYPING SEROLOGIC RH(D): CPT

## 2022-06-13 PROCEDURE — 85014 HEMATOCRIT: CPT

## 2022-06-13 PROCEDURE — 82330 ASSAY OF CALCIUM: CPT

## 2022-06-13 PROCEDURE — 82435 ASSAY OF BLOOD CHLORIDE: CPT

## 2022-06-13 PROCEDURE — 80053 COMPREHEN METABOLIC PANEL: CPT

## 2022-06-13 PROCEDURE — 76705 ECHO EXAM OF ABDOMEN: CPT

## 2022-06-13 PROCEDURE — 86900 BLOOD TYPING SEROLOGIC ABO: CPT

## 2022-06-13 PROCEDURE — 71045 X-RAY EXAM CHEST 1 VIEW: CPT

## 2022-06-13 PROCEDURE — 86850 RBC ANTIBODY SCREEN: CPT

## 2022-06-13 PROCEDURE — 84484 ASSAY OF TROPONIN QUANT: CPT

## 2022-06-13 PROCEDURE — 85018 HEMOGLOBIN: CPT

## 2022-06-13 PROCEDURE — 93005 ELECTROCARDIOGRAM TRACING: CPT

## 2022-06-13 PROCEDURE — 82947 ASSAY GLUCOSE BLOOD QUANT: CPT

## 2022-06-13 PROCEDURE — 71045 X-RAY EXAM CHEST 1 VIEW: CPT | Mod: 26

## 2022-06-13 PROCEDURE — 85025 COMPLETE CBC W/AUTO DIFF WBC: CPT

## 2022-06-13 PROCEDURE — 83690 ASSAY OF LIPASE: CPT

## 2022-06-13 PROCEDURE — 76705 ECHO EXAM OF ABDOMEN: CPT | Mod: 26

## 2022-06-13 PROCEDURE — 74177 CT ABD & PELVIS W/CONTRAST: CPT | Mod: MA

## 2022-06-13 PROCEDURE — 99285 EMERGENCY DEPT VISIT HI MDM: CPT | Mod: 25

## 2022-06-13 PROCEDURE — 74177 CT ABD & PELVIS W/CONTRAST: CPT | Mod: 26,MA

## 2022-06-13 PROCEDURE — 82565 ASSAY OF CREATININE: CPT

## 2022-06-13 PROCEDURE — 85610 PROTHROMBIN TIME: CPT

## 2022-06-13 PROCEDURE — 99285 EMERGENCY DEPT VISIT HI MDM: CPT

## 2022-06-13 PROCEDURE — 82803 BLOOD GASES ANY COMBINATION: CPT

## 2022-06-13 PROCEDURE — 96374 THER/PROPH/DIAG INJ IV PUSH: CPT

## 2022-06-13 RX ORDER — SODIUM CHLORIDE 9 MG/ML
1000 INJECTION, SOLUTION INTRAVENOUS ONCE
Refills: 0 | Status: COMPLETED | OUTPATIENT
Start: 2022-06-13 | End: 2022-06-13

## 2022-06-13 RX ORDER — ONDANSETRON 8 MG/1
4 TABLET, FILM COATED ORAL ONCE
Refills: 0 | Status: COMPLETED | OUTPATIENT
Start: 2022-06-13 | End: 2022-06-13

## 2022-06-13 RX ORDER — MORPHINE SULFATE 50 MG/1
4 CAPSULE, EXTENDED RELEASE ORAL ONCE
Refills: 0 | Status: DISCONTINUED | OUTPATIENT
Start: 2022-06-13 | End: 2022-06-13

## 2022-06-13 RX ADMIN — MORPHINE SULFATE 4 MILLIGRAM(S): 50 CAPSULE, EXTENDED RELEASE ORAL at 05:28

## 2022-06-13 RX ADMIN — SODIUM CHLORIDE 1000 MILLILITER(S): 9 INJECTION, SOLUTION INTRAVENOUS at 06:12

## 2022-06-13 NOTE — ED PROVIDER NOTE - NSFOLLOWUPCLINICS_GEN_ALL_ED_FT
[facility]  Gastroenterology  [address] [place] [id number]  Phone: [phone]  Fax:   Follow Up Time: 7-10 Days

## 2022-06-13 NOTE — ED PROVIDER NOTE - CLINICAL SUMMARY MEDICAL DECISION MAKING FREE TEXT BOX
Shazia, PGY3: concern for carlito/pancreatitis/intra abd vte?/gu etiology. Low suspicion intrathoracic cause. Will do labs, analgesia, fluids, ekg, cxr, US, consider Ct. Resasess.

## 2022-06-13 NOTE — ED PROVIDER NOTE - PROGRESS NOTE DETAILS
Shazia, PGY3: pts pain improved with morphine, US without acute GB/ findings. Pt put in for CT scan. Will f/u Spring Sandy MD, PGY-2: pt reassessed, endorses pain level 5 (prior was 10), pt doing PO challenge now, abdomen soft/nontender. explained negative ct results. Spring Sandy MD, PGY-2: pt tolerated PO challenge. ready for dc home.

## 2022-06-13 NOTE — ED PROVIDER NOTE - PHYSICAL EXAMINATION
GENERAL: uncomfortable appearing, shaking, afebrile rectally  HEENT: normal conjunctiva, oral mucosa moist, no tongue lacs  CARDIAC: regular rate and regular rhythm, bp reassuring  PULM: clear to ascultation bilaterally, no incr wob, sats well on RA  GI: abdomen nondistended, soft, RUQ tenderness, ?guarding  : no CVA tenderness, no suprapubic tenderness  NEURO: alert and oriented x 3, normal speech, moving all extremities without lateralization  MSK: no visible deformities, no peripheral edema  SKIN: no visible rashes  PSYCH: appropriate mood and affect

## 2022-06-13 NOTE — ED PROVIDER NOTE - NSFOLLOWUPINSTRUCTIONS_ED_ALL_ED_FT
--take tylenol or motrin as needed for pain. Take tylenol 650 mg every 6 hours. take over the counter medications for pain - pecid and maalox, take as instructed.   --today, the lab tests we did include basic blood tests, cardiac enzymes, the imaging tests we did include abdominal ct, abdominal ultrasound. results significant for no acute abdominal pathology, no kidney stone, no gallbladder stone. we have included these test results in your paperwork. please take them to your follow-up appointment  --given that you were in the ED today, we recommend a followup visit with your general doctor (primary care doctor) AND GI specialist within 7 days.   --your diagnosis is: abdominal pain  --return to the ED if your current symptoms worsen, if unable to eat/drink for up to 1 day, if pain changes in type or nature/changes in location.

## 2022-06-13 NOTE — ED PROVIDER NOTE - ATTENDING CONTRIBUTION TO CARE
Manpreet Watts MD:   I personally saw the patient and performed a substantive portion of the visit including all aspects of the medical decision making.    MDM: 47 M w/ Hx of cirrhosis, alcohol abuse, HLD, DM, SERENE, who p/w epigastric abd pain with N/V NBNB and chills.   Exam with (+) TTP to epigastrium and guarding.   Patient with abdominal pain and tenderness on examination. Will obtain CT abdomen/pelvis to assess for acute intraabdominal surgical and infectious pathology. Will obtain labs, give medications, and monitor closely. Will obtain labs to evaluate for hematologic disorder, metabolic derangements, hepatic and renal function, and screen for infectious pathology.

## 2022-06-13 NOTE — ED PROVIDER NOTE - PATIENT PORTAL LINK FT
You can access the FollowMyHealth Patient Portal offered by [facility] by registering at the following website: [email or web address]. By joining [email or web address]’s FollowMyHealth portal, you will also be able to view your health information using other applications (apps) compatible with our system.

## 2022-06-13 NOTE — ED PROCEDURE NOTE - NS ED ATTENDING STATEMENT MOD
This was a shared visit with the DANNIE. I reviewed and verified the documentation and independently performed the documented:

## 2022-06-13 NOTE — ED ADULT NURSE NOTE - OBJECTIVE STATEMENT
46 y/o male PMHx Deviated nasal septum, Dyslipidemia, Fatty liver, Hypertrophy of tonsils, Nasal turbinate hypertrophy, SERENE on CPAP arrives to [facility] ED by car from home with spouse with c/o abdominal pain. Pt states sudden onset upper abdominal pain R > L 3 hours prior to ED arrival a/w 1 episode nonbloody emesis. Patient is A&Ox4. Respirations spontaneous and unlabored. Denies SOB, dyspnea, cough, chest pain, palpitations. Upper abdominal pain, TTP. +nausea, denies c/d/v. Endorses urinary frequency x 3 weeks. Denies fever, +chills. No sick contacts. Skin is warm/dry and normal for race. Ambulates independently at baseline.

## 2022-06-13 NOTE — ED PROVIDER NOTE - OBJECTIVE STATEMENT
47M PMH ?Stage 3 Cirrhosis, Alcohol Abuse (s/p 3 weeks rehab dc'd recently), presents with RUQ abd pain, n/nbnb emesis that began at 12am th is evening. Never felt pain like this before. Unable to take meds dt vomiting. Pt reports intermittently urinating frequently?. Denies any fevers/chills, uri sxs, cough, chest pain, sob, palpitations, diarrhea, dark/bloody stools.   No hx abd surgeries. LBM earlier today, still passing gas.

## 2022-06-13 NOTE — ED PROCEDURE NOTE - ATTENDING APP SHARED VISIT CONTRIBUTION OF CARE
I, EM Attending, Manpreet Watts was present for and supervised the critical portions of the procedure performed by the Resident Physician or DANNIE.

## 2022-06-28 ENCOUNTER — APPOINTMENT (OUTPATIENT)
Dept: HEPATOLOGY | Facility: CLINIC | Age: 47
End: 2022-06-28
Payer: COMMERCIAL

## 2022-06-28 VITALS
HEIGHT: 65 IN | BODY MASS INDEX: 24.32 KG/M2 | OXYGEN SATURATION: 100 % | HEART RATE: 99 BPM | DIASTOLIC BLOOD PRESSURE: 82 MMHG | TEMPERATURE: 97.8 F | WEIGHT: 146 LBS | RESPIRATION RATE: 14 BRPM | SYSTOLIC BLOOD PRESSURE: 126 MMHG

## 2022-06-28 DIAGNOSIS — K76.0 FATTY (CHANGE OF) LIVER, NOT ELSEWHERE CLASSIFIED: ICD-10-CM

## 2022-06-28 DIAGNOSIS — Z91.89 OTHER SPECIFIED PERSONAL RISK FACTORS, NOT ELSEWHERE CLASSIFIED: ICD-10-CM

## 2022-06-28 DIAGNOSIS — R74.8 ABNORMAL LEVELS OF OTHER SERUM ENZYMES: ICD-10-CM

## 2022-06-28 DIAGNOSIS — Z87.898 PERSONAL HISTORY OF OTHER SPECIFIED CONDITIONS: ICD-10-CM

## 2022-06-28 PROCEDURE — 90636 HEP A/HEP B VACC ADULT IM: CPT

## 2022-06-28 PROCEDURE — 91200 LIVER ELASTOGRAPHY: CPT

## 2022-06-28 PROCEDURE — 90471 IMMUNIZATION ADMIN: CPT

## 2022-06-28 PROCEDURE — 99215 OFFICE O/P EST HI 40 MIN: CPT

## 2022-06-28 RX ORDER — NALTREXONE HYDROCHLORIDE 50 MG/1
50 TABLET, FILM COATED ORAL DAILY
Qty: 30 | Refills: 3 | Status: DISCONTINUED | COMMUNITY
Start: 2021-06-02 | End: 2022-06-28

## 2022-06-28 RX ORDER — DICLOFENAC SODIUM 75 MG/1
75 TABLET, DELAYED RELEASE ORAL
Qty: 60 | Refills: 0 | Status: DISCONTINUED | COMMUNITY
Start: 2021-07-27 | End: 2022-06-28

## 2022-06-28 NOTE — HISTORY OF PRESENT ILLNESS
[de-identified] : Mr. Muse is a 48 yo M with dyslipidemia, history of pre-diabetes (resolved), SERENE (on CPAP), and alcohol use disorder, who is being seen for follow-up of alcohol-associated liver disease (ALD).\par \par Abdominal US (4/8/16): Increased echogenicity of the liver consistent with fatty infiltration. Normal spleen. No ascites.\par \par CT abdomen/pelvis with contrast (4/12/18): Mild hepatic steatosis. No focal hepatic lesions. Otherwise unremarkable.\par \par FibroScan (6/2/21): Median liver stiffness 9.8 kPA (consistent with F2 fibrosis) and CAP score of 275 dB/m (consistent with S2 steatosis).\par \par CT abdomen/pelvis with contrast (6/13/22): Normal appearing liver and spleen. No acute abdominopelvic pathology. Stable 3 mm LLL subpleural nodule unchanged since 4/12/18.\par \par FibroScan (6/28/22): Median liver stiffness 11.1 kPA (consistent with F2 fibrosis) and CAP score of 217 dB/m (consistent with S0 steatosis).\par \par He is accompanied by his wife, Rhett Muse, who is a nurse practitioner at the [employer] [facility] [employer] [facility] [employer] for [facility].\par \par He was last seen by me as an outpatient >1 year ago on 6/2/21. He did inpatient alcohol detoxification at [facility] from 6/24/21-6/27/21 but did not re-establish outpatient care again today. He relapsed drinking heavily shortly after his prior detoxification. He was seen by tele-psychiatry by Dr. Bart Allen on 9/14/21 but was subsequently lost to follow-up. More recently, after intervention by his wife and other family, he went to a rehabilitation facility in [place], [place], arriving on 5/26/22. He initially had detoxification there with Ativan taper over the first 5 days, then participated in inpatient alcohol rehabilitation. However, he admits that he was "working most of the time" while there in his private room and only attended some of the group sessions in the 1.5 weeks after detox and only met with his individual counselor for about an hour once. He was supposed to be there 4 weeks but ended up leaving and returning to [place] on 6/8/22. He has a 30-day script for atorvastatin (started while at the [facility] for his dyslipidemia) but otherwise was not discharged on any medications. While there, he had also been receiving several "vitamins", melatonin, and a muscle relaxant, as well as some ear drops to help with congestion after his flight.\par \par He denies any alcohol consumption including no slips since returning to [place] after the recent rehabilitation, but admits that he thinks about drinking sometimes and wondered whether it might be okay for him to have "a few drinks with dinner" sometimes. When discussing triggers for his alcohol consumption, he said that he thinks the alcohol made him "think better and work better" and denied any family or work stress as triggers. He never started naltrexone that I had prescribed to him last year and is not currently on any MAT. He also is not currently engaged with any alcohol RPP or psychiatrist. He says he is not interested in any outpatient RPP right now but would be open to re-engaging with a psychiatrist or counselor one-on-one, if I think it is necessary. \par \par He and his wife are unsure what his liver enzymes were when he was first admitted to [facility], but do know he had labs checked on 5/27/22 that worsened when re-checked there on 6/3/22. The second set of labs showed normal  but marked AST and ALT elevations of 478 and 892 U/L, respectively, and mild hyperbilirubinemia with TB 1.4. He said the doctor there said it was alcohol-related "showing up late."\par \par He has been feeling well since returning home, apart from one episode of severe RUQ pain with associated nausea/vomiting that occurred on 6/13/22 and prompted an ER visit at [facility]. He underwent CT abdomen/pelvis that did not show any acute pathology. Lipase was mildly elevated at 117. He received morphine while in the ER and his symptoms resolved and have not recurred since then.\par \par He reports good appetite with no nausea/vomiting and no abdominal pain. He has regular BMs.\par \par He underwent colonoscopy in 2006 due to his family history of colon cancer. He was asymptomatic at the time. Per his wife, it was normal with no polyps or masses seen. No prior EGD.\par \par He has no known family history of alcohol or substance use disorders or liver diseases. He has a strong family history of colon cancer affecting multiple maternal relatives across >1 generation, including some of his mother's siblings as well as his cousins and cousin's children. He thinks the youngest family member to get diagnosed with colon cancer was his cousin's son, who was diagnosed at age 26 or 27. His mother's siblings also had pancreatic and brain tumors, and several maternal relatives have diabetes. Both of his parents are living. He has 1 brother and 1 sister, both healthy to his knowledge. His children are healthy.\par \par He is  and lives with his wife and their two children (ages 12 and 13 years). They have been  since 2006. He smokes cigarettes daily and is not yet ready to quit. He denies any history of recreational drug use. He works in finance.

## 2022-06-28 NOTE — ASSESSMENT
[FreeTextEntry1] : 48 yo M with dyslipidemia, history of pre-diabetes (resolved), SERENE (on CPAP), and severe AUD in early remission (with last reported alcohol on 5/26/22), with alcohol-associated liver disease without known cirrhosis. \par \par During his recent inpatient alcohol rehabilitation last month, he appears to have had an acute hepatocellular injury with AST and ALT with a pattern and degrees of elevation atypical for ALD and strongly suspicious for an idiosyncratic drug-induced liver injury (DILI). The causative medication is uncertain. Given that his liver enzymes have improved when re-checked in the ER on 6/13/22 despite continued use of atorvastatin, that medication is unlikely to be causative. Will re-check liver chemistries today for trend.\par \par He has not had evidence of cirrhosis on imaging thus far. FibroScan today suggested possible moderate (F2) fibrosis but increased compared to last year. This is discordant with his liver enzymes that are actually better than when he had his FibroScan last year and in the context of recent alcohol sobriety. I suspect that his true degree of hepatic fibrosis may in fact be less. MR elastography today ordered for more sensitive evaluation.\par \par We discussed the diagnosis of ALD. I reviewed the natural history, evaluation and staging of the disease including his FibroScan results, and prognosis, including possible risks of development of alcoholic hepatitis (if he resumes heavy drinking), cirrhosis, and hepatocellular carcinoma (HCC). We discussed that alcohol-associated fatty liver is reversible and that sustained alcohol abstinence is the most important modifiable risk factor for improving his liver health. \par \par In terms of his severe AUD in early remission, I am concerned that he is at high risk of relapse and discussed this with him today in detail. I strongly advised him to re-consider trying naltrexone as MAT (re-prescribed today), and if intolerant we can also consider alternatives such as acamprosate or gabapentin. I also strongly advised him about the need for continued engagement with AUD treatment. He declined referral to an outpatient alcohol RPP but is open to re-engaging one-on-one with an addictiion psychiatrist or counselor. I will reach out to his prior psychiatrist Dr. Allen today to see what options are available for him.\par \par # Family history of colon cancer: No personal history of colon polyps on initial colonoscopy in 2006 but now overdue for surveillance. Repeat colonoscopy re-ordered today. The risks, benefits and alternatives of the procedure were discussed with the patient in detail. Risks include bleeding, infection, bowel perforation, adverse reaction to sedation and missed lesions. All questions were answered. The patient expressed understanding of these risks and is agreeable to proceed.\par \par # Health maintenance:\par - He is HAV and HBV non-immune and starting vaccinations today.\par \par Next follow-up: 3 months

## 2022-06-29 LAB
25(OH)D3 SERPL-MCNC: 30.1 NG/ML
AFP-TM SERPL-MCNC: 2.1 NG/ML
ALBUMIN SERPL ELPH-MCNC: 5.1 G/DL
ALP BLD-CCNC: 75 U/L
ALT SERPL-CCNC: 29 U/L
ANION GAP SERPL CALC-SCNC: 13 MMOL/L
AST SERPL-CCNC: 21 U/L
BASOPHILS # BLD AUTO: 0.06 K/UL
BASOPHILS NFR BLD AUTO: 0.7 %
BILIRUB SERPL-MCNC: 0.5 MG/DL
BUN SERPL-MCNC: 7 MG/DL
CALCIUM SERPL-MCNC: 10.5 MG/DL
CHLORIDE SERPL-SCNC: 102 MMOL/L
CO2 SERPL-SCNC: 26 MMOL/L
CREAT SERPL-MCNC: 0.91 MG/DL
EGFR: 105 ML/MIN/1.73M2
EOSINOPHIL # BLD AUTO: 0.37 K/UL
EOSINOPHIL NFR BLD AUTO: 4.1 %
ESTIMATED AVERAGE GLUCOSE: 120 MG/DL
FERRITIN SERPL-MCNC: 262 NG/ML
GGT SERPL-CCNC: 91 U/L
GLUCOSE SERPL-MCNC: 94 MG/DL
HBA1C MFR BLD HPLC: 5.8 %
HCT VFR BLD CALC: 47.3 %
HGB BLD-MCNC: 16.2 G/DL
IMM GRANULOCYTES NFR BLD AUTO: 0.2 %
INR PPP: 1.11 RATIO
IRON SATN MFR SERPL: 41 %
IRON SERPL-MCNC: 142 UG/DL
LYMPHOCYTES # BLD AUTO: 2.05 K/UL
LYMPHOCYTES NFR BLD AUTO: 23 %
MAGNESIUM SERPL-MCNC: 2.1 MG/DL
MAN DIFF?: NORMAL
MCHC RBC-ENTMCNC: 33.5 PG
MCHC RBC-ENTMCNC: 34.2 GM/DL
MCV RBC AUTO: 97.9 FL
MONOCYTES # BLD AUTO: 0.6 K/UL
MONOCYTES NFR BLD AUTO: 6.7 %
NEUTROPHILS # BLD AUTO: 5.82 K/UL
NEUTROPHILS NFR BLD AUTO: 65.3 %
PHOSPHATE SERPL-MCNC: 3.8 MG/DL
PLATELET # BLD AUTO: 292 K/UL
POTASSIUM SERPL-SCNC: 4.7 MMOL/L
PROT SERPL-MCNC: 7.1 G/DL
PT BLD: 12.9 SEC
RBC # BLD: 4.83 M/UL
RBC # FLD: 12.1 %
SODIUM SERPL-SCNC: 142 MMOL/L
TIBC SERPL-MCNC: 351 UG/DL
UIBC SERPL-MCNC: 209 UG/DL
WBC # FLD AUTO: 8.92 K/UL

## 2022-07-01 LAB — ETHYL GLUCURONIDE UR QL SCN: NEGATIVE

## 2022-07-05 LAB
VIT B1 SERPL-MCNC: 142 NMOL/L
ZINC SERPL-MCNC: 86 UG/DL

## 2022-07-11 LAB — PHOSPHATIDYETHANOL (PETH), WHOLE BLOOD: 66 NG/ML

## 2022-09-06 ENCOUNTER — OUTPATIENT (OUTPATIENT)
Dept: OUTPATIENT SERVICES | Facility: HOSPITAL | Age: 47
LOS: 1 days | End: 2022-09-06
Payer: COMMERCIAL

## 2022-09-06 ENCOUNTER — APPOINTMENT (OUTPATIENT)
Dept: HEPATOLOGY | Facility: HOSPITAL | Age: 47
End: 2022-09-06

## 2022-09-06 ENCOUNTER — TRANSCRIPTION ENCOUNTER (OUTPATIENT)
Age: 47
End: 2022-09-06

## 2022-09-06 VITALS
RESPIRATION RATE: 20 BRPM | SYSTOLIC BLOOD PRESSURE: 139 MMHG | HEART RATE: 92 BPM | TEMPERATURE: 98 F | HEIGHT: 65 IN | WEIGHT: 149.91 LBS | DIASTOLIC BLOOD PRESSURE: 84 MMHG | OXYGEN SATURATION: 100 %

## 2022-09-06 VITALS
OXYGEN SATURATION: 96 % | SYSTOLIC BLOOD PRESSURE: 114 MMHG | HEART RATE: 83 BPM | DIASTOLIC BLOOD PRESSURE: 75 MMHG | RESPIRATION RATE: 20 BRPM

## 2022-09-06 DIAGNOSIS — Z98.89 OTHER SPECIFIED POSTPROCEDURAL STATES: Chronic | ICD-10-CM

## 2022-09-06 DIAGNOSIS — Z91.89 OTHER SPECIFIED PERSONAL RISK FACTORS, NOT ELSEWHERE CLASSIFIED: ICD-10-CM

## 2022-09-06 LAB — SARS-COV-2 N GENE NPH QL NAA+PROBE: NOT DETECTED

## 2022-09-06 PROCEDURE — 45378 DIAGNOSTIC COLONOSCOPY: CPT

## 2022-09-06 PROCEDURE — G0105: CPT

## 2022-09-06 DEVICE — NET RETRV ROT ROTH 2.5MMX230CM: Type: IMPLANTABLE DEVICE | Status: FUNCTIONAL

## 2022-09-06 RX ORDER — SODIUM CHLORIDE 9 MG/ML
500 INJECTION INTRAMUSCULAR; INTRAVENOUS; SUBCUTANEOUS
Refills: 0 | Status: COMPLETED | OUTPATIENT
Start: 2022-09-06 | End: 2022-09-06

## 2022-09-06 RX ADMIN — SODIUM CHLORIDE 30 MILLILITER(S): 9 INJECTION INTRAMUSCULAR; INTRAVENOUS; SUBCUTANEOUS at 08:30

## 2022-09-06 NOTE — ASU DISCHARGE PLAN (ADULT/PEDIATRIC) - NSDISCH_INCISION_ENDO_ALL_CORE_FT
If an incision was performed as part of your procedure, contact your doctor with any pain, swelling, redness, or other concerns you may have about that area. Female Pregnancy Counseling Text: Female patients should also be on two forms of birth control while taking this medication and for one month after their last dose.

## 2022-09-06 NOTE — PRE PROCEDURE NOTE - PRE PROCEDURE EVALUATION
Attending Physician:     Dr. Naresh Carranza                       Procedure:  Colonoscopy    Indication for Procedure:  Screening for colon cancer  ________________________________________________________  PAST MEDICAL & SURGICAL HISTORY:  SERENE on CPAP  Dyslipidemia  Deviated nasal septum  Hypertrophy of tonsils  Nasal turbinate hypertrophy  Fatty liver - alcohol-associated  S/P correction of deviated nasal septum 2007  S/P repair of hydrocele, right    ALLERGIES:  No Known Allergies    HOME MEDICATIONS:  Reviewed.    AICD/PPM: [ ] yes   [X] no    PERTINENT LAB DATA:  Reviewed.    PHYSICAL EXAMINATION:    T(C): --  HR: --  BP: --  RR: --  SpO2: --    Constitutional: NAD    Neck:  No JVD  Respiratory: CTAB/L  Cardiovascular: S1 and S2  Gastrointestinal: BS+, soft, NT/ND  Extremities: No peripheral edema  Neurological: A/O x 3, no focal deficits    COMMENTS:    The patient is a suitable candidate for the planned procedure unless box checked [ ]  No, explain:

## 2022-09-06 NOTE — ASU DISCHARGE PLAN (ADULT/PEDIATRIC) - NS MD DC FALL RISK RISK
For information on Fall & Injury Prevention, visit: [email or web address] OR  [email or web address] OR  https://www.cdc.gov/steadi/patient.html

## 2022-09-21 NOTE — ED ADULT NURSE NOTE - PRO INTERPRETER NEED 2
Facial Steaming: steamed Number Of Passes: 2 Prefacial Cleansing: The face was washed with a cleanser. Detail Level: Zone Indication: skin rejuvenation Post-Care Instructions: I reviewed with the patient in detail post-care instructions. Patient should stay away from the sun and wear sun protection until treated areas are fully healed. Treatment Number: 0 Vacuum Pressure: 15 Prep Text: The patient's skin was cleaned and prepped. Consent: Written consent obtained, risks reviewed including but not limited to crusting, scabbing, blistering, scarring, darker or lighter pigmentary change, bruising, and/or incomplete response. English

## 2023-06-20 ENCOUNTER — APPOINTMENT (OUTPATIENT)
Dept: PULMONOLOGY | Facility: CLINIC | Age: 48
End: 2023-06-20
Payer: COMMERCIAL

## 2023-06-20 VITALS
BODY MASS INDEX: 23.99 KG/M2 | HEART RATE: 110 BPM | RESPIRATION RATE: 15 BRPM | WEIGHT: 144 LBS | DIASTOLIC BLOOD PRESSURE: 83 MMHG | HEIGHT: 65 IN | SYSTOLIC BLOOD PRESSURE: 123 MMHG | OXYGEN SATURATION: 97 % | TEMPERATURE: 97.9 F

## 2023-06-20 DIAGNOSIS — G47.33 OBSTRUCTIVE SLEEP APNEA (ADULT) (PEDIATRIC): ICD-10-CM

## 2023-06-20 PROCEDURE — 99214 OFFICE O/P EST MOD 30 MIN: CPT

## 2023-06-20 RX ORDER — NALTREXONE HYDROCHLORIDE 50 MG/1
50 TABLET, FILM COATED ORAL
Qty: 90 | Refills: 3 | Status: DISCONTINUED | COMMUNITY
Start: 2022-06-28 | End: 2023-06-20

## 2023-06-20 RX ORDER — ATORVASTATIN CALCIUM 40 MG/1
40 TABLET, FILM COATED ORAL DAILY
Refills: 0 | Status: DISCONTINUED | COMMUNITY
End: 2023-06-20

## 2023-06-20 RX ORDER — PEG-3350, SODIUM SULFATE, SODIUM CHLORIDE, POTASSIUM CHLORIDE, SODIUM ASCORBATE AND ASCORBIC ACID 7.5-2.691G
100 KIT ORAL
Qty: 1 | Refills: 0 | Status: DISCONTINUED | COMMUNITY
Start: 2021-06-02 | End: 2023-06-20

## 2023-06-20 NOTE — ASSESSMENT
[FreeTextEntry1] : 48 year old SHAHID ANNA   complies well and benefits from CPAP therapy since 2016 for: \par \par •	Severe ESRENE (KARTIK 72.5) on CPAP 12 cmH2O using a full-face mask.\par \par Sleep has improved with CPAP.  [place] Sleepiness Scale score is 8.\par  \par DreamStation 2 Auto CPAP downloaded data was reviewed with patient:  \par Good CPAP compliance (93.3% of days, 93.3% for >4-hrs, averaging 9-hrs 6-mins.),  \par therapeutic AHI 10.1 on pressure of 12 cmH2O, acceptable mask leak.        \par \par Vomiting after using CPAP, consulted with [facility].  For possible aerophagia, turned C-flex off.\par Dry humidifier chamber in a.m. - reduced humidifier level from 4 to 2.\par Residual AHI 10.1, s/p weight loss:  set to 7-day AutoTrial\par \par [facility] PAP supply renewal to continue therapy.\par Annual follow-up, sooner if needed.\par \par \par \par \par \par \par

## 2023-06-20 NOTE — REVIEW OF SYSTEMS
[Recent Wt Loss (___ Lbs)] : recent [unfilled] ~Ulb weight loss [Negative] : Psychiatric [Fatigue] : no fatigue [Difficulty Initiating Sleep] : no difficulty falling asleep [Difficulty Maintaining Sleep] : no difficulty maintaining sleep [Lower Extremity Discomfort] : no lower extremity discomfort [Unusual Sleep Behavior] : no unusual sleep behavior [FreeTextEntry9] : Denies dyslpidemia [FreeTextEntry7] : Denies liver disease

## 2023-06-20 NOTE — HISTORY OF PRESENT ILLNESS
[Frequent Nocturnal Awakening] : frequent nocturnal awakening [FreeTextEntry1] : 48 year old male following up to 9/2020 visit for:. \par •	Severe SERENE (KARTIK 72.5) on CPAP 12 cmH2O using a full-face mask.\par \par 2011:  DX severe SERENE (AHI 41.0),  CPAP intolerant, noncompliant for years.\par 2016:  UPPP, septoplasty, tonsillectomy by Dr. Godinez\par ………Titration 5/17/2016: optimal CPAP 10 cmH2O normalized AHI to 1.2 \par 2018:  DX HST 9/18/18: KARTIK 72.5. T<90 57.8%. Eddy oxygen desaturation 51%.\par \par 2023:\par CPAP improves sleep.  Received DreamStation 2 Auto CPAP early this year, replaced 2018 recall from [facility].  Would like to try nasal pillows mask.  c/o Throwing up bile in the morning x1-2 hours after using CPAP only.  He was away for one week without CPAP and this did not occur.  Humidifier chamber is empty of water in a.m.  >10-lb unintentional weight loss (no appetite).\par \par Compliant 93.3% of days averaging 9-hrs 6-mins.  \par Therapeutic AHI 10.1 on CPAP 12 cmH2O.  Insignificant mask leak (12 mins). \par \par Variable 3-8 hours sleep between 10 pm-6 am.  SOL<1-hour.  Awakens multiple times for unclear reasons.  WASO 1-hour.  No naps.  Drowsy driving, no MVA.  Works 6-12 hours usually daytime, rarely at night as .\par \par History of:  Dyslipidemia, alcohol-associated liver disease\par  [Snoring] : no snoring [Witnessed Apneas] : no witnessed sleep apnea [Unintentional Sleep while Active] : no unintentional sleep while active [Unintentional Sleep While Inactive] : no unintentional sleep while inactive [Awakes Unrefreshed] : does not awake unrefreshed [Awakes with Headache] : no headache upon awakening [Awakening With Dry Mouth] : no dry mouth upon awakening [Recent  Weight Gain] : no recent weight gain [Daytime Somnolence] : no daytime somnolence [ESS] : 8

## 2023-08-08 ENCOUNTER — APPOINTMENT (OUTPATIENT)
Dept: HEPATOLOGY | Facility: CLINIC | Age: 48
End: 2023-08-08
Payer: COMMERCIAL

## 2023-08-08 VITALS
HEIGHT: 65 IN | SYSTOLIC BLOOD PRESSURE: 118 MMHG | RESPIRATION RATE: 14 BRPM | DIASTOLIC BLOOD PRESSURE: 82 MMHG | TEMPERATURE: 97.4 F | OXYGEN SATURATION: 98 % | HEART RATE: 105 BPM

## 2023-08-08 DIAGNOSIS — Z23 ENCOUNTER FOR IMMUNIZATION: ICD-10-CM

## 2023-08-08 DIAGNOSIS — F10.20 ALCOHOL DEPENDENCE, UNCOMPLICATED: ICD-10-CM

## 2023-08-08 DIAGNOSIS — Z78.9 OTHER SPECIFIED HEALTH STATUS: ICD-10-CM

## 2023-08-08 DIAGNOSIS — K70.0 ALCOHOLIC FATTY LIVER: ICD-10-CM

## 2023-08-08 DIAGNOSIS — F17.200 NICOTINE DEPENDENCE, UNSPECIFIED, UNCOMPLICATED: ICD-10-CM

## 2023-08-08 LAB
AFP-TM SERPL-MCNC: 3.1 NG/ML
INR PPP: 0.94 RATIO
PT BLD: 10.6 SEC

## 2023-08-08 PROCEDURE — 90471 IMMUNIZATION ADMIN: CPT

## 2023-08-08 PROCEDURE — 90636 HEP A/HEP B VACC ADULT IM: CPT

## 2023-08-08 PROCEDURE — 99214 OFFICE O/P EST MOD 30 MIN: CPT

## 2023-08-08 NOTE — HISTORY OF PRESENT ILLNESS
[de-identified] : Mr. Muse is a 49 yo M with dyslipidemia (not currently on medication), history of prediabetes (resolved), SERENE (on CPAP), and alcohol use disorder, who is being seen for follow-up of alcohol-associated liver disease (ALD).  Abdominal US (4/8/16): Increased echogenicity of the liver consistent with fatty infiltration. Normal spleen. No ascites.  CT abdomen/pelvis with contrast (4/12/18): Mild hepatic steatosis. No focal hepatic lesions. Otherwise unremarkable.  FibroScan (6/2/21): Median liver stiffness 9.8 kPA (consistent with F2 fibrosis) and CAP score of 275 dB/m (consistent with S2 steatosis).  CT abdomen/pelvis with contrast (6/13/22): Normal appearing liver and spleen. No acute abdominopelvic pathology. Stable 3 mm LLL subpleural nodule unchanged since 4/12/18.  FibroScan (6/28/22): Median liver stiffness 11.1 kPA (consistent with F2 fibrosis) and CAP score of 217 dB/m (consistent with S0 steatosis).  He was last seen by me in this office on 6/28/22 (>1 year ago) and also underwent elective outpatient screening colonoscopy with me on 9/6/22 that had fair bowel preparation but with no polyps or masses seen, with plan for repeat colonoscopy in 5 years given the bowel preparation as well as his family history of colon cancer.  He has not had any ER visits or hospitalizations since his last visit, but admits that he relapsed to drinking alcohol sometime after the colonoscopy around 11/2022, though he cannot recall the exact date. His drinking gradually re-increased, especially within the past 1-1.5 months when he has also been under stress due to moving to a new smaller home with his wife and children. He is currently drinking 1 pint of whiskey daily (including yesterday). He typically starts drinking around 11am as otherwise he has withdrawal tremors in the mornings. He also has nausea in the mornings but attributes this to use of his CPAP, as when he travels out of town for work and does not use his CPAP machine, he does not have the morning nausea even though he continues to drink alcohol. He is not currently taking any medications or supplements and does not have a PCP. He tried to quit drinking 2 weeks ago over a weekend but had severe withdrawal symptoms including tremors and resumed drinking. He had been hoping that I would be able to prescribe outpatient medication for detoxification today, though last year we had discussed that I do not prescribe that through this office due to concerns about safety of outpatient and unmonitored benzodiazepine detoxification, especially in patients with ALD. He is not currently open or interested in referral to inpatient detoxification such as through the ER or hospital. He is also not sure he will be able to taper down his drinking. I had previously prescribed him naltrexone 50 mg po daily last year, but he says he only took it for about a month and did not notice much benefit in terms of alcohol cravings. He denies any jaundice, scleral icterus, overt GI bleeding, dyspnea, abdominal pain or distension, lower extremity swelling, or confusion or mental fogginess. His appetite has been poor for the past month but he attributes this to stress.  He is  and lives with his wife and their two children. His wife is an outpatient NP at the [facility]. They have been  since 2006. He smokes cigarettes daily and is not yet ready to quit. He denies any history of recreational drug use. He works in finance.

## 2023-08-08 NOTE — ASSESSMENT
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 However, his last labs >1 year ago (on 6/28/22) showed interval normalized liver chemistries. - Re-check labs today, especially in context of resumed high risk alcohol consumption. - No evidence of cirrhosis or portal hypertension on prior imaging but with abdominal US ordered today. - Prior FibroScan (6/28/22) suggested moderate (F2) fibrosis, with repeat FibroScan ordered today.  We discussed the diagnosis of ALD. I reviewed the natural history, evaluation and staging of the disease including his prior FibroScan results, and prognosis, including possible risks of development of alcohol-associated hepatitis (if he continues heavy drinking), cirrhosis, and hepatocellular carcinoma (HCC). We discussed that alcohol-associated fatty liver is reversible and that sustained alcohol abstinence is the most important modifiable risk factor for improving his liver health.   # Severe AUD with relapse: - He previously did inpatient alcohol detoxification and part of an inpatient alcohol RPP in 2022, but has since relapsed. He is not open to referral back to ER or hospital for inpatient detoxification again today, but I discussed with him that I do not feel it is safe to him to receive outpatient and unmonitored benzodiazepines for detoxification. I have reached out to our  service today to explore if there are any monitored outpatient options for detoxification, but this is unlikely. We also discussed trying to gradually taper down his alcohol, but he is unsure if he would be capable of doing that. - He previously tried oral naltrexone for ~1 month in 2022 for AUD pharmacotherapy but did not find it beneficial. We can consider alternatives for AUD pharmacotherapy once he is abstinent again (after detoxification).  # Health maintenance: - He is HAV and HBV non-immune but is receiving his second dose of Twinrix today, with 3rd to be scheduled. - Last colonoscopy (9/6/22) with fair bowel preparation and no polyps seen. Repeat in 5 years given fair bowel preparation as well as his family history of colon cancer.  Next follow-up: 3 months

## 2023-08-08 NOTE — PHYSICAL EXAM
[Scleral Icterus] : No Scleral Icterus [Spider Angioma] : No spider angioma(s) were observed [Abdominal  Ascites] : no ascites [Splenomegaly] : no splenomegaly [Liver Palpable ___ Finger Breadths Below Costal Margin] : Liver edge was palpable [unfilled] finger breadths below costal margin [Non-Tender] : non-tender [Smooth] : smooth [Asterixis] : no asterixis observed [Jaundice] : No jaundice [Palmar Erythema] : no Palmar Erythema [FreeTextEntry4] : +mild palpable hepatomegaly [General Appearance - Alert] : alert [General Appearance - In No Acute Distress] : in no acute distress [General Appearance - Well Nourished] : well nourished [General Appearance - Well Developed] : well developed [General Appearance - Well-Appearing] : healthy appearing [Sclera] : the sclera and conjunctiva were normal [PERRL With Normal Accommodation] : pupils were equal in size, round, and reactive to light [Extraocular Movements] : extraocular movements were intact [Hearing Threshold Finger Rub Not Jack] : hearing was normal [Oropharynx] : the oropharynx was normal [Neck Appearance] : the appearance of the neck was normal [Neck Cervical Mass (___cm)] : no neck mass was observed [Jugular Venous Distention Increased] : there was no jugular-venous distention [Respiration, Rhythm And Depth] : normal respiratory rhythm and effort [Exaggerated Use Of Accessory Muscles For Inspiration] : no accessory muscle use [Auscultation Breath Sounds / Voice Sounds] : lungs were clear to auscultation bilaterally [Heart Rate And Rhythm] : heart rate was normal and rhythm regular [Heart Sounds] : normal S1 and S2 [Murmurs] : no murmurs [Edema] : there was no peripheral edema [Bowel Sounds] : normal bowel sounds [Abdomen Soft] : soft [Abdomen Tenderness] : non-tender [Abdomen Mass (___ Cm)] : no abdominal mass palpated [Abdomen Hernia] : no hernia was discovered [Abnormal Walk] : normal gait [Nail Clubbing] : no clubbing  or cyanosis of the fingernails [Involuntary Movements] : no involuntary movements were seen [Skin Color & Pigmentation] : normal skin color and pigmentation [Skin Turgor] : normal skin turgor [] : no rash [Oriented To Time, Place, And Person] : oriented to person, place, and time [Impaired Insight] : insight and judgment were intact [Affect] : the affect was normal [Mood] : the mood was normal [Memory Recent] : recent memory was not impaired [Memory Remote] : remote memory was not impaired

## 2023-08-09 ENCOUNTER — NON-APPOINTMENT (OUTPATIENT)
Age: 48
End: 2023-08-09

## 2023-08-09 DIAGNOSIS — E87.5 HYPERKALEMIA: ICD-10-CM

## 2023-08-09 LAB
ALBUMIN SERPL ELPH-MCNC: 5.6 G/DL
ALP BLD-CCNC: 74 U/L
ALT SERPL-CCNC: 220 U/L
ANION GAP SERPL CALC-SCNC: 18 MMOL/L
AST SERPL-CCNC: 155 U/L
BILIRUB DIRECT SERPL-MCNC: 0.3 MG/DL
BILIRUB SERPL-MCNC: 0.9 MG/DL
BUN SERPL-MCNC: 9 MG/DL
CALCIUM SERPL-MCNC: 10.2 MG/DL
CHLORIDE SERPL-SCNC: 100 MMOL/L
CHOLEST SERPL-MCNC: 317 MG/DL
CO2 SERPL-SCNC: 25 MMOL/L
CREAT SERPL-MCNC: 0.79 MG/DL
EGFR: 110 ML/MIN/1.73M2
ESTIMATED AVERAGE GLUCOSE: 108 MG/DL
GGT SERPL-CCNC: 410 U/L
GLUCOSE SERPL-MCNC: 90 MG/DL
HBA1C MFR BLD HPLC: 5.4 %
HDLC SERPL-MCNC: 82 MG/DL
LDLC SERPL CALC-MCNC: 218 MG/DL
NONHDLC SERPL-MCNC: 235 MG/DL
POTASSIUM SERPL-SCNC: 5.5 MMOL/L
PROT SERPL-MCNC: 7.6 G/DL
SODIUM SERPL-SCNC: 144 MMOL/L
TRIGL SERPL-MCNC: 99 MG/DL
TSH SERPL-ACNC: 0.39 UIU/ML

## 2023-09-05 ENCOUNTER — APPOINTMENT (OUTPATIENT)
Dept: HEPATOLOGY | Facility: CLINIC | Age: 48
End: 2023-09-05

## 2023-11-01 ENCOUNTER — APPOINTMENT (OUTPATIENT)
Dept: INTERNAL MEDICINE | Facility: CLINIC | Age: 48
End: 2023-11-01

## 2024-03-28 ENCOUNTER — NON-APPOINTMENT (OUTPATIENT)
Age: 49
End: 2024-03-28

## 2024-04-10 ENCOUNTER — NON-APPOINTMENT (OUTPATIENT)
Age: 49
End: 2024-04-10

## 2024-05-08 ENCOUNTER — NON-APPOINTMENT (OUTPATIENT)
Age: 49
End: 2024-05-08

## 2024-05-10 ENCOUNTER — NON-APPOINTMENT (OUTPATIENT)
Age: 49
End: 2024-05-10

## 2024-05-10 LAB
AFP-TM SERPL-MCNC: 5.1 NG/ML
BASOPHILS # BLD AUTO: 0.05 K/UL
BASOPHILS NFR BLD AUTO: 0.8 %
EOSINOPHIL # BLD AUTO: 0.04 K/UL
EOSINOPHIL NFR BLD AUTO: 0.6 %
HCT VFR BLD CALC: 42.5 %
HGB BLD-MCNC: 14.6 G/DL
IMM GRANULOCYTES NFR BLD AUTO: 0.3 %
LYMPHOCYTES # BLD AUTO: 0.79 K/UL
LYMPHOCYTES NFR BLD AUTO: 12.4 %
MAN DIFF?: NORMAL
MCHC RBC-ENTMCNC: 34.4 GM/DL
MCHC RBC-ENTMCNC: 34.5 PG
MCV RBC AUTO: 100.5 FL
MONOCYTES # BLD AUTO: 0.49 K/UL
MONOCYTES NFR BLD AUTO: 7.7 %
NEUTROPHILS # BLD AUTO: 4.97 K/UL
NEUTROPHILS NFR BLD AUTO: 78.2 %
PLATELET # BLD AUTO: 187 K/UL
RBC # BLD: 4.23 M/UL
RBC # FLD: 13.2 %
WBC # FLD AUTO: 6.36 K/UL

## 2024-05-11 ENCOUNTER — EMERGENCY (EMERGENCY)
Facility: HOSPITAL | Age: 49
LOS: 1 days | Discharge: HOME | End: 2024-05-11
Attending: EMERGENCY MEDICINE | Admitting: EMERGENCY MEDICINE
Payer: COMMERCIAL

## 2024-05-11 VITALS
OXYGEN SATURATION: 98 % | DIASTOLIC BLOOD PRESSURE: 95 MMHG | RESPIRATION RATE: 20 BRPM | TEMPERATURE: 98 F | SYSTOLIC BLOOD PRESSURE: 138 MMHG | WEIGHT: 123.02 LBS | HEIGHT: 65 IN | HEART RATE: 100 BPM

## 2024-05-11 VITALS
RESPIRATION RATE: 18 BRPM | TEMPERATURE: 97 F | OXYGEN SATURATION: 99 % | HEART RATE: 100 BPM | SYSTOLIC BLOOD PRESSURE: 142 MMHG | DIASTOLIC BLOOD PRESSURE: 94 MMHG

## 2024-05-11 DIAGNOSIS — Z98.89 OTHER SPECIFIED POSTPROCEDURAL STATES: Chronic | ICD-10-CM

## 2024-05-11 LAB
ANION GAP SERPL CALC-SCNC: 12 MMOL/L — SIGNIFICANT CHANGE UP (ref 5–17)
BUN SERPL-MCNC: 9 MG/DL — SIGNIFICANT CHANGE UP (ref 7–23)
CALCIUM SERPL-MCNC: 9.7 MG/DL — SIGNIFICANT CHANGE UP (ref 8.4–10.5)
CHLORIDE SERPL-SCNC: 102 MMOL/L — SIGNIFICANT CHANGE UP (ref 96–108)
CO2 SERPL-SCNC: 28 MMOL/L — SIGNIFICANT CHANGE UP (ref 22–31)
CREAT SERPL-MCNC: 0.77 MG/DL — SIGNIFICANT CHANGE UP (ref 0.5–1.3)
EGFR: 110 ML/MIN/1.73M2 — SIGNIFICANT CHANGE UP
GLUCOSE SERPL-MCNC: 98 MG/DL — SIGNIFICANT CHANGE UP (ref 70–99)
POTASSIUM SERPL-MCNC: 4.7 MMOL/L — SIGNIFICANT CHANGE UP (ref 3.5–5.3)
POTASSIUM SERPL-SCNC: 4.7 MMOL/L — SIGNIFICANT CHANGE UP (ref 3.5–5.3)
SODIUM SERPL-SCNC: 142 MMOL/L — SIGNIFICANT CHANGE UP (ref 135–145)

## 2024-05-11 PROCEDURE — 80048 BASIC METABOLIC PNL TOTAL CA: CPT

## 2024-05-11 PROCEDURE — 36415 COLL VENOUS BLD VENIPUNCTURE: CPT

## 2024-05-11 PROCEDURE — 99284 EMERGENCY DEPT VISIT MOD MDM: CPT

## 2024-05-11 PROCEDURE — 99283 EMERGENCY DEPT VISIT LOW MDM: CPT

## 2024-05-11 RX ORDER — SODIUM CHLORIDE 9 MG/ML
1000 INJECTION INTRAMUSCULAR; INTRAVENOUS; SUBCUTANEOUS ONCE
Refills: 0 | Status: COMPLETED | OUTPATIENT
Start: 2024-05-11 | End: 2024-05-11

## 2024-05-11 RX ADMIN — SODIUM CHLORIDE 1000 MILLILITER(S): 9 INJECTION INTRAMUSCULAR; INTRAVENOUS; SUBCUTANEOUS at 20:03

## 2024-05-11 NOTE — ED PROVIDER NOTE - OBJECTIVE STATEMENT
49y M with h/o sleep apnea related to ENT issues sent in by PCP for elevated K on labs - pt had routine blood work yesterday, no cp, no sob, no acute complaints, 49y M with h/o sleep apnea related to ENT issues sent in by PCP for elevated K on labs - pt had routine blood work yesterday, no cp, no sob, no acute complaints - pt does endorse for a few months having a dull pain left side/flank, states it feels like it is inside, not msk, also reports poor appetite, about 20lb weight loss which was unintentional, states no BM in 8-9 days, has n/v in the mornings - bilious, but says this is only when he uses his CPAP overnight (which is most nights, but when he misses a night of cpap no vomiting), no fever/chills

## 2024-05-11 NOTE — ED ADULT TRIAGE NOTE - CHIEF COMPLAINT QUOTE
Sent to ER by Dr. Carranza for elevated potassium level. bloods drawn yesterday morning. also c/o left side abd pain "for weeks" w/ nausea in a.m. and constipation x1 week. Hx CPAP

## 2024-05-11 NOTE — ED PROVIDER NOTE - CARE PROVIDER_API CALL
Naresh Carranza  Transplant Hepatology  [address] [place] [id number]  Phone: [phone]  Fax: [phone]  Follow Up Time: 1-3 Days

## 2024-05-11 NOTE — ED PROVIDER NOTE - CARE PLAN
Principal Discharge DX:	Abnormal laboratory test  Secondary Diagnosis:	Constipation  Secondary Diagnosis:	Abdominal pain   1

## 2024-05-11 NOTE — ED PROVIDER NOTE - PROGRESS NOTE DETAILS
pt's wife at bedside, is a nurse at [employer], states she is considering taking him to [facility] instead of staying for work up here, states he has h/o acute liver failure and she works with the liver transplant team there and all of his doctors are there, advise that we can do the labs here and discussed ct a/p, will rediscuss after wife and pt discuss their wishes pt will have labs done here, does not want to go to [facility], has refused ct a/p, wife at bedside, aware of discussion discussed results with pt, normal K, likely was hemolyzed; rediscussed pt's subacute symptoms, declines ct, strongly advise close f/u with pcp for further evaluation, pt understands concern for cancer/mass, will f/u outpt

## 2024-05-11 NOTE — ED PROVIDER NOTE - CLINICAL SUMMARY MEDICAL DECISION MAKING FREE TEXT BOX
49y M presents for K check after high K found on routine test, pt denies acute complaints, but relays subacute (few months) left flank pain/constipation/decreased appetite, almost 20lb unintentional weight loss, advise that we would do a ct a/p today, concern for cancer/pass 49y M presents for K check after high K found on routine test, pt denies acute complaints, but relays subacute (few months) left flank pain/constipation/decreased appetite, almost 20lb unintentional weight loss, advise that we would do a ct a/p today, concern for renal/colon mass/cancer, have discussed this with pt and wife, pt refusing ct at this time, understands concern but declines, advise pt strongly to reconsider this while here and if not very strongly advise to see his PCP ASAP for further evaluation

## 2024-05-11 NOTE — ED PROVIDER NOTE - PATIENT PORTAL LINK FT
You can access the FollowMyHealth Patient Portal offered by [facility] by registering at the following website: [email or web address] By joining [facility]’s FollowMyHealth portal, you will also be able to view your health information using other applications (apps) compatible with our system.

## 2024-05-11 NOTE — ED ADULT NURSE NOTE - NSFALLUNIVINTERV_ED_ALL_ED
Bed/Stretcher in lowest position, wheels locked, appropriate side rails in place/Call bell, personal items and telephone in reach/Instruct patient to call for assistance before getting out of bed/chair/stretcher/Non-slip footwear applied when patient is off stretcher/[place] to call system/Physically safe environment - no spills, clutter or unnecessary equipment/Purposeful proactive rounding/Room/bathroom lighting operational, light cord in reach

## 2024-05-11 NOTE — ED PROVIDER NOTE - NSFOLLOWUPINSTRUCTIONS_ED_ALL_ED_FT
CALL YOUR PRIMARY CARE PROVIDER FIRST THING MONDAY MORNING TO ARRANGE FOLLOW UP AND DISCUSS YOUR ABDOMINAL DISCOMFORT AND WEIGHT LOSS, THIS REQUIRES FURTHER EVALUATION    ------------------------------------------------------------------      Constipation, Adult  Constipation is when a person has fewer than three bowel movements in a week, has difficulty having a bowel movement, or has stools (feces) that are dry, hard, or larger than normal. Constipation may be caused by an underlying condition. It may become worse with age if a person takes certain medicines and does not take in enough fluids.    Follow these instructions at home:  Eating and drinking      Eat foods that have a lot of fiber, such as beans, whole grains, and fresh fruits and vegetables.  Limit foods that are low in fiber and high in fat and processed sugars, such as fried or sweet foods. These include french fries, hamburgers, cookies, candies, and soda.  Drink enough fluid to keep your urine pale yellow.  General instructions    Exercise regularly or as told by your health care provider. Try to do 150 minutes of moderate exercise each week.  Use the bathroom when you have the urge to go. Do not hold it in.  Take over-the-counter and prescription medicines only as told by your health care provider. This includes any fiber supplements.  During bowel movements:  Practice deep breathing while relaxing the lower abdomen.  Practice pelvic floor relaxation.  Watch your condition for any changes. Let your health care provider know about them.  Keep all follow-up visits as told by your health care provider. This is important.  Contact a health care provider if:  You have pain that gets worse.  You have a fever.  You do not have a bowel movement after 4 days.  You vomit.  You are not hungry or you lose weight.  You are bleeding from the opening between the buttocks (anus).  You have thin, pencil-like stools.  Get help right away if:  You have a fever and your symptoms suddenly get worse.  You leak stool or have blood in your stool.  Your abdomen is bloated.  You have severe pain in your abdomen.  You feel dizzy or you faint.  Summary  Constipation is when a person has fewer than three bowel movements in a week, has difficulty having a bowel movement, or has stools (feces) that are dry, hard, or larger than normal.  Eat foods that have a lot of fiber, such as beans, whole grains, and fresh fruits and vegetables.  Drink enough fluid to keep your urine pale yellow.  Take over-the-counter and prescription medicines only as told by your health care provider. This includes any fiber supplements.  This information is not intended to replace advice given to you by your health care provider. Make sure you discuss any questions you have with your health care provider.

## 2024-05-13 ENCOUNTER — NON-APPOINTMENT (OUTPATIENT)
Age: 49
End: 2024-05-13

## 2024-05-13 DIAGNOSIS — E53.8 DEFICIENCY OF OTHER SPECIFIED B GROUP VITAMINS: ICD-10-CM

## 2024-05-13 DIAGNOSIS — E55.9 VITAMIN D DEFICIENCY, UNSPECIFIED: ICD-10-CM

## 2024-05-13 LAB
25(OH)D3 SERPL-MCNC: 18.2 NG/ML
ALBUMIN SERPL ELPH-MCNC: 5.3 G/DL
ALP BLD-CCNC: 81 U/L
ALT SERPL-CCNC: 133 U/L
ANION GAP SERPL CALC-SCNC: 18 MMOL/L
AST SERPL-CCNC: 142 U/L
BILIRUB DIRECT SERPL-MCNC: 0.4 MG/DL
BILIRUB SERPL-MCNC: 0.8 MG/DL
BUN SERPL-MCNC: 8 MG/DL
CALCIUM SERPL-MCNC: 10.5 MG/DL
CHLORIDE SERPL-SCNC: 101 MMOL/L
CO2 SERPL-SCNC: 26 MMOL/L
CREAT SERPL-MCNC: 0.64 MG/DL
EGFR: 117 ML/MIN/1.73M2
ESTIMATED AVERAGE GLUCOSE: 103 MG/DL
FERRITIN SERPL-MCNC: 1163 NG/ML
FOLATE SERPL-MCNC: 2.8 NG/ML
GGT SERPL-CCNC: 715 U/L
GLUCOSE SERPL-MCNC: 107 MG/DL
HBA1C MFR BLD HPLC: 5.2 %
INR PPP: 0.99 RATIO
IRON SATN MFR SERPL: 53 %
IRON SERPL-MCNC: 180 UG/DL
MAGNESIUM SERPL-MCNC: 2.1 MG/DL
PHOSPHATE SERPL-MCNC: 3.6 MG/DL
POTASSIUM SERPL-SCNC: 6.3 MMOL/L
PROT SERPL-MCNC: 7.8 G/DL
PT BLD: 11.3 SEC
SODIUM SERPL-SCNC: 145 MMOL/L
TIBC SERPL-MCNC: 339 UG/DL
UIBC SERPL-MCNC: 159 UG/DL
VIT B12 SERPL-MCNC: 601 PG/ML

## 2024-05-13 RX ORDER — MULTIVIT-MIN/IRON/FOLIC ACID/K 18-600-40
50 MCG CAPSULE ORAL DAILY
Qty: 90 | Refills: 3 | Status: ACTIVE | COMMUNITY
Start: 2024-05-13 | End: 1900-01-01

## 2024-05-13 RX ORDER — MULTIVITAMIN WITH FOLIC ACID 400 MCG
TABLET ORAL
Qty: 90 | Refills: 3 | Status: ACTIVE | COMMUNITY
Start: 2023-08-08 | End: 1900-01-01

## 2024-05-13 RX ORDER — FOLIC ACID 1 MG/1
1 TABLET ORAL
Qty: 90 | Refills: 3 | Status: ACTIVE | COMMUNITY
Start: 2023-08-08 | End: 1900-01-01

## 2024-05-14 LAB — ZINC SERPL-MCNC: 89 UG/DL

## 2024-05-16 LAB — VIT B1 SERPL-MCNC: 96.9 NMOL/L

## 2024-06-26 ENCOUNTER — APPOINTMENT (OUTPATIENT)
Dept: PULMONOLOGY | Facility: CLINIC | Age: 49
End: 2024-06-26
Payer: COMMERCIAL

## 2024-06-26 DIAGNOSIS — G47.33 OBSTRUCTIVE SLEEP APNEA (ADULT) (PEDIATRIC): ICD-10-CM

## 2024-06-26 PROCEDURE — 99213 OFFICE O/P EST LOW 20 MIN: CPT

## 2024-09-30 ENCOUNTER — NON-APPOINTMENT (OUTPATIENT)
Age: 49
End: 2024-09-30

## 2024-11-06 ENCOUNTER — APPOINTMENT (OUTPATIENT)
Dept: INTERNAL MEDICINE | Facility: CLINIC | Age: 49
End: 2024-11-06
Payer: COMMERCIAL

## 2024-11-06 ENCOUNTER — NON-APPOINTMENT (OUTPATIENT)
Age: 49
End: 2024-11-06

## 2024-11-06 ENCOUNTER — LABORATORY RESULT (OUTPATIENT)
Age: 49
End: 2024-11-06

## 2024-11-06 VITALS
HEART RATE: 92 BPM | WEIGHT: 125 LBS | TEMPERATURE: 98.2 F | SYSTOLIC BLOOD PRESSURE: 118 MMHG | RESPIRATION RATE: 16 BRPM | BODY MASS INDEX: 20.83 KG/M2 | OXYGEN SATURATION: 98 % | DIASTOLIC BLOOD PRESSURE: 74 MMHG | HEIGHT: 65 IN

## 2024-11-06 DIAGNOSIS — T56.0X1D TOXIC EFFECT OF LEAD AND ITS COMPOUNDS, ACCIDENTAL (UNINTENTIONAL), SUBSEQUENT ENCOUNTER: ICD-10-CM

## 2024-11-06 DIAGNOSIS — E78.5 HYPERLIPIDEMIA, UNSPECIFIED: ICD-10-CM

## 2024-11-06 DIAGNOSIS — R31.9 HEMATURIA, UNSPECIFIED: ICD-10-CM

## 2024-11-06 DIAGNOSIS — M10.10 TOXIC EFFECT OF LEAD AND ITS COMPOUNDS, ACCIDENTAL (UNINTENTIONAL), SUBSEQUENT ENCOUNTER: ICD-10-CM

## 2024-11-06 PROCEDURE — 99386 PREV VISIT NEW AGE 40-64: CPT

## 2024-11-06 PROCEDURE — 93000 ELECTROCARDIOGRAM COMPLETE: CPT

## 2024-11-06 RX ORDER — METHYLPREDNISOLONE 4 MG/1
4 TABLET ORAL
Qty: 1 | Refills: 0 | Status: ACTIVE | COMMUNITY
Start: 2024-11-06 | End: 1900-01-01

## 2024-11-07 LAB
25(OH)D3 SERPL-MCNC: 14.4 NG/ML
ALBUMIN SERPL ELPH-MCNC: 4.5 G/DL
ALP BLD-CCNC: 110 U/L
ALT SERPL-CCNC: 74 U/L
ANION GAP SERPL CALC-SCNC: 18 MMOL/L
APPEARANCE: CLEAR
APTT BLD: 31.7 SEC
AST SERPL-CCNC: 93 U/L
BACTERIA: NEGATIVE /HPF
BASOPHILS # BLD AUTO: 0.06 K/UL
BASOPHILS NFR BLD AUTO: 0.7 %
BILIRUB SERPL-MCNC: 0.4 MG/DL
BILIRUBIN URINE: NEGATIVE
BLOOD URINE: NEGATIVE
BUN SERPL-MCNC: 5 MG/DL
CALCIUM SERPL-MCNC: 9.5 MG/DL
CAST: 0 /LPF
CHLORIDE SERPL-SCNC: 103 MMOL/L
CHOLEST SERPL-MCNC: 301 MG/DL
CK SERPL-CCNC: 123 U/L
CO2 SERPL-SCNC: 23 MMOL/L
COLOR: YELLOW
CREAT SERPL-MCNC: 0.93 MG/DL
EGFR: 101 ML/MIN/1.73M2
EOSINOPHIL # BLD AUTO: 0.19 K/UL
EOSINOPHIL NFR BLD AUTO: 2.3 %
EPITHELIAL CELLS: 1 /HPF
ESTIMATED AVERAGE GLUCOSE: 94 MG/DL
GLUCOSE QUALITATIVE U: NEGATIVE MG/DL
GLUCOSE SERPL-MCNC: 132 MG/DL
HBA1C MFR BLD HPLC: 4.9 %
HCT VFR BLD CALC: 40.6 %
HDLC SERPL-MCNC: 63 MG/DL
HGB BLD-MCNC: 13.5 G/DL
IMM GRANULOCYTES NFR BLD AUTO: 0.5 %
INR PPP: 0.96 RATIO
KETONES URINE: NEGATIVE MG/DL
LDLC SERPL CALC-MCNC: 213 MG/DL
LEUKOCYTE ESTERASE URINE: NEGATIVE
LYMPHOCYTES # BLD AUTO: 1.65 K/UL
LYMPHOCYTES NFR BLD AUTO: 20.3 %
MAN DIFF?: NORMAL
MCHC RBC-ENTMCNC: 33.3 G/DL
MCHC RBC-ENTMCNC: 35.2 PG
MCV RBC AUTO: 106 FL
MICROSCOPIC-UA: NORMAL
MONOCYTES # BLD AUTO: 0.56 K/UL
MONOCYTES NFR BLD AUTO: 6.9 %
NEUTROPHILS # BLD AUTO: 5.61 K/UL
NEUTROPHILS NFR BLD AUTO: 69.3 %
NITRITE URINE: NEGATIVE
NONHDLC SERPL-MCNC: 238 MG/DL
PH URINE: 6.5
PLATELET # BLD AUTO: 306 K/UL
POTASSIUM SERPL-SCNC: 4.2 MMOL/L
PROT SERPL-MCNC: 7.1 G/DL
PROTEIN URINE: NORMAL MG/DL
PSA SERPL-MCNC: 1.5 NG/ML
PT BLD: 11.3 SEC
RBC # BLD: 3.83 M/UL
RBC # FLD: 14.6 %
RED BLOOD CELLS URINE: 2 /HPF
REVIEW: NORMAL
SODIUM SERPL-SCNC: 144 MMOL/L
SPECIFIC GRAVITY URINE: 1.02
TRIGL SERPL-MCNC: 138 MG/DL
TSH SERPL-ACNC: 0.89 UIU/ML
URATE SERPL-MCNC: 6.1 MG/DL
UROBILINOGEN URINE: 1 MG/DL
WBC # FLD AUTO: 8.11 K/UL
WHITE BLOOD CELLS URINE: 0 /HPF

## 2024-11-07 RX ORDER — ATORVASTATIN CALCIUM 20 MG/1
20 TABLET, FILM COATED ORAL
Qty: 1 | Refills: 0 | Status: ACTIVE | COMMUNITY
Start: 2024-11-07 | End: 1900-01-01

## 2024-11-07 RX ORDER — ERGOCALCIFEROL 1.25 MG/1
1.25 MG CAPSULE, LIQUID FILLED ORAL
Qty: 12 | Refills: 1 | Status: ACTIVE | COMMUNITY
Start: 2024-11-07 | End: 1900-01-01

## 2024-11-13 ENCOUNTER — APPOINTMENT (OUTPATIENT)
Dept: NEUROLOGY | Facility: CLINIC | Age: 49
End: 2024-11-13
Payer: COMMERCIAL

## 2024-11-13 VITALS
BODY MASS INDEX: 20.83 KG/M2 | WEIGHT: 125 LBS | HEART RATE: 112 BPM | HEIGHT: 65 IN | DIASTOLIC BLOOD PRESSURE: 96 MMHG | SYSTOLIC BLOOD PRESSURE: 151 MMHG

## 2024-11-13 PROCEDURE — 99204 OFFICE O/P NEW MOD 45 MIN: CPT

## 2024-11-13 RX ORDER — TOPIRAMATE 25 MG/1
25 TABLET, FILM COATED ORAL
Qty: 120 | Refills: 11 | Status: ACTIVE | COMMUNITY
Start: 2024-11-13 | End: 1900-01-01

## 2024-11-23 ENCOUNTER — OUTPATIENT (OUTPATIENT)
Dept: OUTPATIENT SERVICES | Facility: HOSPITAL | Age: 49
LOS: 1 days | End: 2024-11-23
Payer: COMMERCIAL

## 2024-11-23 ENCOUNTER — APPOINTMENT (OUTPATIENT)
Dept: MRI IMAGING | Facility: CLINIC | Age: 49
End: 2024-11-23
Payer: COMMERCIAL

## 2024-11-23 DIAGNOSIS — Z98.89 OTHER SPECIFIED POSTPROCEDURAL STATES: Chronic | ICD-10-CM

## 2024-11-23 DIAGNOSIS — F10.20 ALCOHOL DEPENDENCE, UNCOMPLICATED: ICD-10-CM

## 2024-11-23 PROCEDURE — 76377 3D RENDER W/INTRP POSTPROCES: CPT

## 2024-11-23 PROCEDURE — 76377 3D RENDER W/INTRP POSTPROCES: CPT | Mod: 26

## 2024-11-23 PROCEDURE — 70553 MRI BRAIN STEM W/O & W/DYE: CPT | Mod: 26

## 2024-11-23 PROCEDURE — A9585: CPT

## 2024-11-23 PROCEDURE — 70553 MRI BRAIN STEM W/O & W/DYE: CPT

## 2024-11-29 ENCOUNTER — APPOINTMENT (OUTPATIENT)
Dept: NEUROLOGY | Facility: CLINIC | Age: 49
End: 2024-11-29
Payer: COMMERCIAL

## 2024-11-29 PROCEDURE — 95816 EEG AWAKE AND DROWSY: CPT

## 2024-12-02 ENCOUNTER — APPOINTMENT (OUTPATIENT)
Dept: NEUROLOGY | Facility: CLINIC | Age: 49
End: 2024-12-02
Payer: COMMERCIAL

## 2024-12-02 PROCEDURE — 95708 EEG WO VID EA 12-26HR UNMNTR: CPT

## 2024-12-02 PROCEDURE — 95719 EEG PHYS/QHP EA INCR W/O VID: CPT

## 2024-12-02 PROCEDURE — 95700 EEG CONT REC W/VID EEG TECH: CPT

## 2025-01-08 ENCOUNTER — APPOINTMENT (OUTPATIENT)
Dept: NEUROLOGY | Facility: CLINIC | Age: 50
End: 2025-01-08

## 2025-05-02 NOTE — ED PROVIDER NOTE - GASTROINTESTINAL, MLM
Pulse Energy (Include Units): 100 mJ/GONZALEZ Cooling: off Wavelength (Include Units): 1340 nm Pulse Duration (Include Units): 3 msec Consent: Prior to the procedure consent obtained, risks reviewed including but not limited to crusting, scabbing, blistering, scarring, darker or lighter pigmentary change, incidental hair removal, bruising, and/or incomplete removal. Pre-Procedure Care: Prior to the procedure the patient and all present had protective eyewear in place and a warning sign was placed on the door. Anesthesia Volume In Cc: 0 Post-Care Instructions: I reviewed with the patient in detail post-care instructions. Patient should stay away from the sun and wear sun protection until treated areas are fully healed. Render Post-Care In The Note: No Detail Level: Zone Spotsize (Include Units): 8 mm Price (Use Numbers Only, No Special Characters Or $): 200 Eye Protection: opaque goggles Laser Type: Nd:YAP End-Point And Post-Procedure Care: The procedure continued until mild purpura was noted.  Immediately following the procedure, Vaseline and ice applied. Post care reviewed with patient. Abdomen soft, non-tender, no guarding.

## (undated) DEVICE — CLAMP BX HOT RAD JAW 3

## (undated) DEVICE — TUBING SUCTION 20FT

## (undated) DEVICE — SENSOR O2 FINGER ADULT

## (undated) DEVICE — POLY TRAP ETRAP

## (undated) DEVICE — SUCTION YANKAUER NO CONTROL VENT

## (undated) DEVICE — BIOPSY FORCEP RADIAL JAW 4 STANDARD WITH NEEDLE

## (undated) DEVICE — SOL INJ NS 0.9% 500ML 2 PORT

## (undated) DEVICE — TUBING SUCTION CONN 6FT STERILE

## (undated) DEVICE — CATH IV SAFE BC 20G X 1.16" (PINK)

## (undated) DEVICE — BRUSH COLONOSCOPY CYTOLOGY

## (undated) DEVICE — CATH IV SAFE BC 22G X 1" (BLUE)

## (undated) DEVICE — SYR LUER LOK 50CC

## (undated) DEVICE — FORCEP RADIAL JAW 4 JUMBO 2.8MM 3.2MM 240CM ORANGE DISP

## (undated) DEVICE — ELCTR GROUNDING PAD ADULT COVIDIEN

## (undated) DEVICE — TUBING IV SET GRAVITY 3Y 100" MACRO

## (undated) DEVICE — PACK IV START WITH CHG

## (undated) DEVICE — IRRIGATOR BIO SHIELD

## (undated) DEVICE — FOLEY HOLDER STATLOCK 2 WAY ADULT